# Patient Record
Sex: FEMALE | Race: WHITE | NOT HISPANIC OR LATINO | URBAN - METROPOLITAN AREA
[De-identification: names, ages, dates, MRNs, and addresses within clinical notes are randomized per-mention and may not be internally consistent; named-entity substitution may affect disease eponyms.]

---

## 2019-10-31 ENCOUNTER — OUTPATIENT (OUTPATIENT)
Dept: OUTPATIENT SERVICES | Facility: HOSPITAL | Age: 29
LOS: 1 days | End: 2019-10-31
Payer: COMMERCIAL

## 2019-10-31 ENCOUNTER — TRANSCRIPTION ENCOUNTER (OUTPATIENT)
Age: 29
End: 2019-10-31

## 2019-10-31 VITALS
RESPIRATION RATE: 18 BRPM | WEIGHT: 188.94 LBS | HEART RATE: 78 BPM | TEMPERATURE: 98 F | HEIGHT: 63 IN | DIASTOLIC BLOOD PRESSURE: 80 MMHG | SYSTOLIC BLOOD PRESSURE: 128 MMHG | OXYGEN SATURATION: 98 %

## 2019-10-31 DIAGNOSIS — O02.1 MISSED ABORTION: ICD-10-CM

## 2019-10-31 DIAGNOSIS — Z98.890 OTHER SPECIFIED POSTPROCEDURAL STATES: Chronic | ICD-10-CM

## 2019-10-31 LAB
BLD GP AB SCN SERPL QL: NEGATIVE — SIGNIFICANT CHANGE UP
HCT VFR BLD CALC: 41.7 % — SIGNIFICANT CHANGE UP (ref 34.5–45)
HGB BLD-MCNC: 14.1 G/DL — SIGNIFICANT CHANGE UP (ref 11.5–15.5)
MCHC RBC-ENTMCNC: 30.9 PG — SIGNIFICANT CHANGE UP (ref 27–34)
MCHC RBC-ENTMCNC: 33.8 GM/DL — SIGNIFICANT CHANGE UP (ref 32–36)
MCV RBC AUTO: 91.2 FL — SIGNIFICANT CHANGE UP (ref 80–100)
PLATELET # BLD AUTO: 244 K/UL — SIGNIFICANT CHANGE UP (ref 150–400)
RBC # BLD: 4.57 M/UL — SIGNIFICANT CHANGE UP (ref 3.8–5.2)
RBC # FLD: 12.1 % — SIGNIFICANT CHANGE UP (ref 10.3–14.5)
RH IG SCN BLD-IMP: POSITIVE — SIGNIFICANT CHANGE UP
WBC # BLD: 9.24 K/UL — SIGNIFICANT CHANGE UP (ref 3.8–10.5)
WBC # FLD AUTO: 9.24 K/UL — SIGNIFICANT CHANGE UP (ref 3.8–10.5)

## 2019-10-31 PROCEDURE — 86901 BLOOD TYPING SEROLOGIC RH(D): CPT

## 2019-10-31 PROCEDURE — 86850 RBC ANTIBODY SCREEN: CPT

## 2019-10-31 PROCEDURE — 85027 COMPLETE CBC AUTOMATED: CPT

## 2019-10-31 PROCEDURE — G0463: CPT

## 2019-10-31 PROCEDURE — 86900 BLOOD TYPING SEROLOGIC ABO: CPT

## 2019-10-31 RX ORDER — LIDOCAINE HCL 20 MG/ML
0.2 VIAL (ML) INJECTION ONCE
Refills: 0 | Status: DISCONTINUED | OUTPATIENT
Start: 2019-11-01 | End: 2019-11-17

## 2019-10-31 RX ORDER — ACETAMINOPHEN 500 MG
1000 TABLET ORAL ONCE
Refills: 0 | Status: COMPLETED | OUTPATIENT
Start: 2019-11-01 | End: 2019-11-01

## 2019-10-31 RX ORDER — SODIUM CHLORIDE 9 MG/ML
3 INJECTION INTRAMUSCULAR; INTRAVENOUS; SUBCUTANEOUS EVERY 8 HOURS
Refills: 0 | Status: DISCONTINUED | OUTPATIENT
Start: 2019-11-01 | End: 2019-11-17

## 2019-10-31 RX ORDER — CELECOXIB 200 MG/1
200 CAPSULE ORAL ONCE
Refills: 0 | Status: COMPLETED | OUTPATIENT
Start: 2019-11-01 | End: 2019-11-01

## 2019-10-31 NOTE — H&P PST ADULT - NSANTHOSAYNRD_GEN_A_CORE
No. SIM screening performed.  STOP BANG Legend: 0-2 = LOW Risk; 3-4 = INTERMEDIATE Risk; 5-8 = HIGH Risk

## 2019-10-31 NOTE — H&P PST ADULT - ATTENDING COMMENTS
Pt seen and examined in the office with Dr. Williamson on this patient with missed ab  pt was advised to use vaginal cytotec before the procedure.  R / b / a of the procedure was disc with the patient who understands and agrees with proceeding.

## 2019-10-31 NOTE — H&P PST ADULT - HISTORY OF PRESENT ILLNESS
This is a 30 y/o female a routine This is a 30 y/o female a routine sonogram revealed no fetal heartbeat, she presents today for  D&C suction curettage

## 2019-11-01 ENCOUNTER — RESULT REVIEW (OUTPATIENT)
Age: 29
End: 2019-11-01

## 2019-11-01 ENCOUNTER — OUTPATIENT (OUTPATIENT)
Dept: OUTPATIENT SERVICES | Facility: HOSPITAL | Age: 29
LOS: 1 days | Discharge: HOME | End: 2019-11-01
Payer: COMMERCIAL

## 2019-11-01 VITALS
SYSTOLIC BLOOD PRESSURE: 105 MMHG | OXYGEN SATURATION: 98 % | DIASTOLIC BLOOD PRESSURE: 62 MMHG | RESPIRATION RATE: 16 BRPM | HEART RATE: 59 BPM | TEMPERATURE: 97 F

## 2019-11-01 VITALS
RESPIRATION RATE: 14 BRPM | WEIGHT: 188.94 LBS | SYSTOLIC BLOOD PRESSURE: 99 MMHG | TEMPERATURE: 98 F | HEART RATE: 69 BPM | OXYGEN SATURATION: 98 % | DIASTOLIC BLOOD PRESSURE: 65 MMHG | HEIGHT: 63 IN

## 2019-11-01 DIAGNOSIS — Z98.890 OTHER SPECIFIED POSTPROCEDURAL STATES: Chronic | ICD-10-CM

## 2019-11-01 DIAGNOSIS — O02.1 MISSED ABORTION: ICD-10-CM

## 2019-11-01 PROCEDURE — 59820 CARE OF MISCARRIAGE: CPT

## 2019-11-01 PROCEDURE — 88305 TISSUE EXAM BY PATHOLOGIST: CPT

## 2019-11-01 PROCEDURE — 88305 TISSUE EXAM BY PATHOLOGIST: CPT | Mod: 26

## 2019-11-01 PROCEDURE — 88264 CHROMOSOME ANALYSIS 20-25: CPT

## 2019-11-01 PROCEDURE — 88233 TISSUE CULTURE SKIN/BIOPSY: CPT

## 2019-11-01 PROCEDURE — 88280 CHROMOSOME KARYOTYPE STUDY: CPT

## 2019-11-01 RX ORDER — OXYCODONE HYDROCHLORIDE 5 MG/1
10 TABLET ORAL ONCE
Refills: 0 | Status: DISCONTINUED | OUTPATIENT
Start: 2019-11-01 | End: 2019-11-01

## 2019-11-01 RX ORDER — SODIUM CHLORIDE 9 MG/ML
1000 INJECTION, SOLUTION INTRAVENOUS
Refills: 0 | Status: DISCONTINUED | OUTPATIENT
Start: 2019-11-01 | End: 2019-11-17

## 2019-11-01 RX ORDER — CELECOXIB 200 MG/1
200 CAPSULE ORAL ONCE
Refills: 0 | Status: DISCONTINUED | OUTPATIENT
Start: 2019-11-01 | End: 2019-11-17

## 2019-11-01 RX ORDER — ONDANSETRON 8 MG/1
4 TABLET, FILM COATED ORAL ONCE
Refills: 0 | Status: DISCONTINUED | OUTPATIENT
Start: 2019-11-01 | End: 2019-11-17

## 2019-11-01 RX ORDER — FAMOTIDINE 10 MG/ML
20 INJECTION INTRAVENOUS ONCE
Refills: 0 | Status: COMPLETED | OUTPATIENT
Start: 2019-11-01 | End: 2019-11-01

## 2019-11-01 RX ORDER — OXYCODONE HYDROCHLORIDE 5 MG/1
5 TABLET ORAL ONCE
Refills: 0 | Status: DISCONTINUED | OUTPATIENT
Start: 2019-11-01 | End: 2019-11-01

## 2019-11-01 RX ADMIN — FAMOTIDINE 20 MILLIGRAM(S): 10 INJECTION INTRAVENOUS at 09:57

## 2019-11-01 RX ADMIN — CELECOXIB 200 MILLIGRAM(S): 200 CAPSULE ORAL at 09:57

## 2019-11-01 RX ADMIN — Medication 1000 MILLIGRAM(S): at 09:57

## 2019-11-01 NOTE — PRE-ANESTHESIA EVALUATION ADULT - NSANTHPMHFT_GEN_ALL_CORE
History of asthma as child, no sxs or inhaler use >15 yrs. Denies other chronic medical problems. Rare GERD, uses TUMS. No recent illness.  LMP 7/12/19, patient states exam yesterday showed growth stopped @8weeks/4 dayskkkkkkkkkkkkkkkkkkkkkkkkkkkkkkkkkkkkkkkkkkkkkkkkkkkkkkkkkkkkkkkkkkkkkkkkkkkkkkkkkkkkkkkkkkk History of asthma as child, no sxs or inhaler use >15 yrs. Denies other chronic medical problems. Rare GERD, uses TUMS. No recent illness.  LMP 7/12/19, patient states exam yesterday showed growth stopped @8weeks/4 days

## 2019-11-01 NOTE — BRIEF OPERATIVE NOTE - NSICDXBRIEFPROCEDURE_GEN_ALL_CORE_FT
PROCEDURES:  Dilation and curettage of uterus using suction with ultrasound guidance 01-Nov-2019 11:46:31  Felicia Baeza

## 2019-11-01 NOTE — ASU DISCHARGE PLAN (ADULT/PEDIATRIC) - CALL YOUR DOCTOR IF YOU HAVE ANY OF THE FOLLOWING:
Wound/Surgical Site with redness, or foul smelling discharge or pus/Bleeding that does not stop/Numbness, tingling, color or temperature change to extremity/Fever greater than (need to indicate Fahrenheit or Celsius)/Swelling that gets worse/Inability to tolerate liquids or foods/Pain not relieved by Medications/Nausea and vomiting that does not stop/Unable to urinate/Excessive diarrhea/Increased irritability or sluggishness

## 2019-11-01 NOTE — ASU DISCHARGE PLAN (ADULT/PEDIATRIC) - CARE PROVIDER_API CALL
Stacy Castillo)  Obstetrics and Gynecology  [address] [place] [address], [place] [address]  Phone: [phone]  Fax: [phone]  Follow Up Time:

## 2019-11-01 NOTE — PRE-ANESTHESIA EVALUATION ADULT - NSANTHADDINFOFT_GEN_ALL_CORE
Denies CP, SOB, cough, fever, acid reflux.  D/w Dr Castillo, uterine size 12 weeks, fetus size 8 weeks, pregnancy by LMP 16 weeks. Decided to proceed with GETA. Patient, surgeon aware and agreed.

## 2019-11-01 NOTE — BRIEF OPERATIVE NOTE - OPERATION/FINDINGS
10w size anteverted uterus, no adnexal masses palpated bilaterally, thin endometrial stripe visualized on ultrasound at end of case, excellent hemostasis noted

## 2019-11-04 LAB — SURGICAL PATHOLOGY STUDY: SIGNIFICANT CHANGE UP

## 2019-11-14 LAB — CHROM ANALY OVERALL INTERP SPEC-IMP: SIGNIFICANT CHANGE UP

## 2020-04-17 ENCOUNTER — APPOINTMENT (OUTPATIENT)
Dept: ANTEPARTUM | Facility: CLINIC | Age: 30
End: 2020-04-17
Payer: COMMERCIAL

## 2020-04-17 ENCOUNTER — ASOB RESULT (OUTPATIENT)
Age: 30
End: 2020-04-17

## 2020-04-17 PROCEDURE — 76813 OB US NUCHAL MEAS 1 GEST: CPT

## 2020-04-17 PROCEDURE — 36416 COLLJ CAPILLARY BLOOD SPEC: CPT

## 2020-04-17 PROCEDURE — 76801 OB US < 14 WKS SINGLE FETUS: CPT

## 2020-05-15 ENCOUNTER — APPOINTMENT (OUTPATIENT)
Dept: ANTEPARTUM | Facility: CLINIC | Age: 30
End: 2020-05-15
Payer: COMMERCIAL

## 2020-05-15 ENCOUNTER — ASOB RESULT (OUTPATIENT)
Age: 30
End: 2020-05-15

## 2020-05-15 PROCEDURE — 76805 OB US >/= 14 WKS SNGL FETUS: CPT

## 2020-06-12 ENCOUNTER — APPOINTMENT (OUTPATIENT)
Dept: ANTEPARTUM | Facility: CLINIC | Age: 30
End: 2020-06-12
Payer: COMMERCIAL

## 2020-06-12 ENCOUNTER — ASOB RESULT (OUTPATIENT)
Age: 30
End: 2020-06-12

## 2020-06-12 ENCOUNTER — APPOINTMENT (OUTPATIENT)
Dept: ANTEPARTUM | Facility: CLINIC | Age: 30
End: 2020-06-12

## 2020-06-12 PROCEDURE — 76816 OB US FOLLOW-UP PER FETUS: CPT

## 2020-06-12 PROCEDURE — 76811 OB US DETAILED SNGL FETUS: CPT

## 2020-06-12 PROCEDURE — ZZZZZ: CPT

## 2020-06-12 PROCEDURE — 99212 OFFICE O/P EST SF 10 MIN: CPT

## 2020-06-22 ENCOUNTER — APPOINTMENT (OUTPATIENT)
Dept: PEDIATRIC CARDIOLOGY | Facility: CLINIC | Age: 30
End: 2020-06-22
Payer: COMMERCIAL

## 2020-06-22 PROCEDURE — 99201 OFFICE OUTPATIENT NEW 10 MINUTES: CPT | Mod: 25

## 2020-06-22 PROCEDURE — 76827 ECHO EXAM OF FETAL HEART: CPT

## 2020-06-22 PROCEDURE — 76825 ECHO EXAM OF FETAL HEART: CPT

## 2020-06-22 PROCEDURE — 93325 DOPPLER ECHO COLOR FLOW MAPG: CPT

## 2020-06-24 ENCOUNTER — APPOINTMENT (OUTPATIENT)
Dept: ANTEPARTUM | Facility: CLINIC | Age: 30
End: 2020-06-24
Payer: COMMERCIAL

## 2020-06-24 ENCOUNTER — ASOB RESULT (OUTPATIENT)
Age: 30
End: 2020-06-24

## 2020-06-24 PROCEDURE — 76815 OB US LIMITED FETUS(S): CPT

## 2020-06-24 PROCEDURE — 99212 OFFICE O/P EST SF 10 MIN: CPT | Mod: 25

## 2020-06-26 ENCOUNTER — APPOINTMENT (OUTPATIENT)
Dept: ANTEPARTUM | Facility: CLINIC | Age: 30
End: 2020-06-26

## 2020-07-03 LAB
CLARI ADDITIONAL INFO: NORMAL
CLARI CHROMOSOME 13: NORMAL
CLARI CHROMOSOME 18: NORMAL
CLARI CHROMOSOME 21: NORMAL
CLARI SEX CHROMOSOMES: NORMAL
CLARITEST NIPT: NORMAL

## 2020-07-24 ENCOUNTER — APPOINTMENT (OUTPATIENT)
Dept: ANTEPARTUM | Facility: CLINIC | Age: 30
End: 2020-07-24
Payer: COMMERCIAL

## 2020-07-24 ENCOUNTER — ASOB RESULT (OUTPATIENT)
Age: 30
End: 2020-07-24

## 2020-07-24 PROCEDURE — 76816 OB US FOLLOW-UP PER FETUS: CPT

## 2020-07-24 PROCEDURE — 99213 OFFICE O/P EST LOW 20 MIN: CPT | Mod: 25

## 2020-08-03 ENCOUNTER — APPOINTMENT (OUTPATIENT)
Dept: PEDIATRIC SURGERY | Facility: CLINIC | Age: 30
End: 2020-08-03
Payer: COMMERCIAL

## 2020-08-03 PROCEDURE — 99204 OFFICE O/P NEW MOD 45 MIN: CPT

## 2020-08-05 NOTE — ASSESSMENT
[FreeTextEntry1] : Ms. Fonseca is a 30 year old female, now with a 26 week gestational age fetus who was found to have a double bubble on ultrasound. Her fetal echocardiogram demonstrated an echogenic foci on the mitral valve without dysfunction and is otherwise normal.  She had normal NIPS and has deferred amniocentesis.  Of note, she has an older son, now 8 years old, who had duodenal atresia and underwent surgical repair with Dr. Sergio nunes in 2012.  \par \par  I spent time educating both Ms. Verduzco and her spouse about the possibility that their fetus has duodenal atresia. While it is possible that this finding resolves and their baby will not have any obstruction, I spent significant time educating them about this diagnosis and the implications of a duodenal obstruction after birth. I reviewed the anatomy with them and that their baby will require surgical intervention after birth. I discussed the procedure at length, including the possibility of either an open or laparoscopic technique. They understand that the type of procedure will be decided after birth based on the overall clinical picture of their baby. I discussed the risks of the procedure including but not limited to bleeding, infection, injury to adjacent structures, anastomotic leak, return to operating room, etc. They understand that it can take several weeks for anastomosis to start functioning and this may result in a prolonged NICU stay. I discussed with them their baby will receive intravenous nutrition in the interim. I discussed that duodenal atresia can be associated with other anomalies including cardiac, chromosomal, gastrointestinal, renal, etc and that a variety of tests will be performed both before and after the procedure to rule out these anomalies. I reviewed with them that I do not recommend a particular mode of delivery nor would I recommend an early delivery for this. At this time, they are planning to deliver at [facility].  I educated them about the Division of Pediatric Surgery and they understand that any one of my partners may be their baby's surgeon.  \par \par Ms. Verduzco and her  have my contact information and know to contact me going forward with any further questions or concerns.

## 2020-08-05 NOTE — REASON FOR VISIT
[New Patient Prenatal Visit] : a new patient prenatal visit [Family Member] : family member [FreeTextEntry4] : Dr. Jaydon Turner

## 2020-08-05 NOTE — CONSULT LETTER
[Dear  ___] : Dear  [unfilled], [Consult Letter:] : I had the pleasure of evaluating your patient, [unfilled]. [Consult Closing:] : Thank you very much for allowing me to participate in the care of this patient.  If you have any questions, please do not hesitate to contact me. [Please see my note below.] : Please see my note below. [Sincerely,] : Sincerely, [FreeTextEntry2] : Dr. Jaydon Turner\par [address] [facility], \[place] [address]\par \par \par Phone: [phone] [FreeTextEntry3] : Heriberto Dougherty MD\par Division of Pediatric, General, Thoracic and Endoscopic Surgery\par [facility]

## 2020-08-05 NOTE — HISTORY OF PRESENT ILLNESS
[FreeTextEntry1] : Ms. Verduzco is a 30 year old female here today as she had fetal ultrasound demonstrating a double bubble.  She is here with her  for surgical counseling.

## 2020-08-06 ENCOUNTER — APPOINTMENT (OUTPATIENT)
Dept: OBGYN | Facility: CLINIC | Age: 30
End: 2020-08-06

## 2020-08-13 ENCOUNTER — NON-APPOINTMENT (OUTPATIENT)
Age: 30
End: 2020-08-13

## 2020-08-13 ENCOUNTER — APPOINTMENT (OUTPATIENT)
Dept: OBGYN | Facility: CLINIC | Age: 30
End: 2020-08-13
Payer: COMMERCIAL

## 2020-08-13 VITALS
HEIGHT: 63 IN | TEMPERATURE: 97.8 F | WEIGHT: 216 LBS | SYSTOLIC BLOOD PRESSURE: 108 MMHG | HEART RATE: 88 BPM | BODY MASS INDEX: 38.27 KG/M2 | DIASTOLIC BLOOD PRESSURE: 73 MMHG

## 2020-08-13 PROCEDURE — 99212 OFFICE O/P EST SF 10 MIN: CPT

## 2020-08-13 RX ORDER — METFORMIN HYDROCHLORIDE 500 MG/1
500 TABLET, COATED ORAL
Qty: 30 | Refills: 0 | Status: DISCONTINUED | COMMUNITY
Start: 2020-03-21 | End: 2020-08-13

## 2020-08-14 ENCOUNTER — ASOB RESULT (OUTPATIENT)
Age: 30
End: 2020-08-14

## 2020-08-14 ENCOUNTER — APPOINTMENT (OUTPATIENT)
Dept: ANTEPARTUM | Facility: CLINIC | Age: 30
End: 2020-08-14
Payer: COMMERCIAL

## 2020-08-14 PROCEDURE — 99214 OFFICE O/P EST MOD 30 MIN: CPT | Mod: 25

## 2020-08-14 PROCEDURE — 76816 OB US FOLLOW-UP PER FETUS: CPT

## 2020-08-24 ENCOUNTER — APPOINTMENT (OUTPATIENT)
Dept: ANTEPARTUM | Facility: CLINIC | Age: 30
End: 2020-08-24
Payer: COMMERCIAL

## 2020-08-24 ENCOUNTER — ASOB RESULT (OUTPATIENT)
Age: 30
End: 2020-08-24

## 2020-08-24 PROCEDURE — 76805 OB US >/= 14 WKS SNGL FETUS: CPT

## 2020-08-24 PROCEDURE — 76819 FETAL BIOPHYS PROFIL W/O NST: CPT

## 2020-08-24 PROCEDURE — 99201 OFFICE OUTPATIENT NEW 10 MINUTES: CPT | Mod: 25

## 2020-09-09 ENCOUNTER — APPOINTMENT (OUTPATIENT)
Dept: ANTEPARTUM | Facility: CLINIC | Age: 30
End: 2020-09-09
Payer: COMMERCIAL

## 2020-09-09 ENCOUNTER — OUTPATIENT (OUTPATIENT)
Dept: OUTPATIENT SERVICES | Facility: HOSPITAL | Age: 30
LOS: 1 days | End: 2020-09-09

## 2020-09-09 ENCOUNTER — ASOB RESULT (OUTPATIENT)
Age: 30
End: 2020-09-09

## 2020-09-09 DIAGNOSIS — Z98.890 OTHER SPECIFIED POSTPROCEDURAL STATES: Chronic | ICD-10-CM

## 2020-09-09 PROCEDURE — 99213 OFFICE O/P EST LOW 20 MIN: CPT | Mod: 25

## 2020-09-09 PROCEDURE — 76818 FETAL BIOPHYS PROFILE W/NST: CPT | Mod: 26

## 2020-09-10 ENCOUNTER — NON-APPOINTMENT (OUTPATIENT)
Age: 30
End: 2020-09-10

## 2020-09-10 ENCOUNTER — APPOINTMENT (OUTPATIENT)
Dept: OBGYN | Facility: CLINIC | Age: 30
End: 2020-09-10
Payer: COMMERCIAL

## 2020-09-10 VITALS
BODY MASS INDEX: 39.34 KG/M2 | DIASTOLIC BLOOD PRESSURE: 81 MMHG | WEIGHT: 222 LBS | HEIGHT: 63 IN | SYSTOLIC BLOOD PRESSURE: 119 MMHG

## 2020-09-10 PROCEDURE — 99212 OFFICE O/P EST SF 10 MIN: CPT

## 2020-09-16 ENCOUNTER — APPOINTMENT (OUTPATIENT)
Dept: ANTEPARTUM | Facility: HOSPITAL | Age: 30
End: 2020-09-16

## 2020-09-16 ENCOUNTER — APPOINTMENT (OUTPATIENT)
Dept: ANTEPARTUM | Facility: CLINIC | Age: 30
End: 2020-09-16

## 2020-09-23 ENCOUNTER — ASOB RESULT (OUTPATIENT)
Age: 30
End: 2020-09-23

## 2020-09-23 ENCOUNTER — OUTPATIENT (OUTPATIENT)
Dept: OUTPATIENT SERVICES | Facility: HOSPITAL | Age: 30
LOS: 1 days | End: 2020-09-23

## 2020-09-23 ENCOUNTER — APPOINTMENT (OUTPATIENT)
Dept: ANTEPARTUM | Facility: CLINIC | Age: 30
End: 2020-09-23
Payer: COMMERCIAL

## 2020-09-23 ENCOUNTER — APPOINTMENT (OUTPATIENT)
Dept: ANTEPARTUM | Facility: HOSPITAL | Age: 30
End: 2020-09-23

## 2020-09-23 DIAGNOSIS — Z98.890 OTHER SPECIFIED POSTPROCEDURAL STATES: Chronic | ICD-10-CM

## 2020-09-23 PROCEDURE — 76816 OB US FOLLOW-UP PER FETUS: CPT

## 2020-09-23 PROCEDURE — 76818 FETAL BIOPHYS PROFILE W/NST: CPT | Mod: 26

## 2020-10-06 ENCOUNTER — NON-APPOINTMENT (OUTPATIENT)
Age: 30
End: 2020-10-06

## 2020-10-06 ENCOUNTER — APPOINTMENT (OUTPATIENT)
Dept: OBGYN | Facility: CLINIC | Age: 30
End: 2020-10-06
Payer: COMMERCIAL

## 2020-10-06 VITALS
WEIGHT: 232 LBS | SYSTOLIC BLOOD PRESSURE: 109 MMHG | DIASTOLIC BLOOD PRESSURE: 75 MMHG | HEIGHT: 63 IN | BODY MASS INDEX: 41.11 KG/M2 | TEMPERATURE: 98.2 F

## 2020-10-06 PROCEDURE — 59025 FETAL NON-STRESS TEST: CPT

## 2020-10-06 PROCEDURE — 99213 OFFICE O/P EST LOW 20 MIN: CPT | Mod: 25

## 2020-10-08 ENCOUNTER — APPOINTMENT (OUTPATIENT)
Dept: ANTEPARTUM | Facility: CLINIC | Age: 30
End: 2020-10-08

## 2020-10-08 ENCOUNTER — APPOINTMENT (OUTPATIENT)
Dept: ANTEPARTUM | Facility: HOSPITAL | Age: 30
End: 2020-10-08

## 2020-10-08 LAB
GP B STREP DNA SPEC QL NAA+PROBE: DETECTED
GP B STREP DNA SPEC QL NAA+PROBE: NORMAL
SOURCE GBS: NORMAL

## 2020-10-14 DIAGNOSIS — O28.3 ABNORMAL ULTRASONIC FINDING ON ANTENATAL SCREENING OF MOTHER: ICD-10-CM

## 2020-10-14 DIAGNOSIS — O40.3XX0 POLYHYDRAMNIOS, THIRD TRIMESTER, NOT APPLICABLE OR UNSPECIFIED: ICD-10-CM

## 2020-10-14 DIAGNOSIS — O35.8XX0 MATERNAL CARE FOR OTHER (SUSPECTED) FETAL ABNORMALITY AND DAMAGE, NOT APPLICABLE OR UNSPECIFIED: ICD-10-CM

## 2020-10-15 ENCOUNTER — ASOB RESULT (OUTPATIENT)
Age: 30
End: 2020-10-15

## 2020-10-15 ENCOUNTER — APPOINTMENT (OUTPATIENT)
Dept: ANTEPARTUM | Facility: HOSPITAL | Age: 30
End: 2020-10-15

## 2020-10-15 ENCOUNTER — TRANSCRIPTION ENCOUNTER (OUTPATIENT)
Age: 30
End: 2020-10-15

## 2020-10-15 ENCOUNTER — OUTPATIENT (OUTPATIENT)
Dept: OUTPATIENT SERVICES | Facility: HOSPITAL | Age: 30
LOS: 1 days | End: 2020-10-15

## 2020-10-15 ENCOUNTER — APPOINTMENT (OUTPATIENT)
Dept: ANTEPARTUM | Facility: CLINIC | Age: 30
End: 2020-10-15
Payer: COMMERCIAL

## 2020-10-15 ENCOUNTER — APPOINTMENT (OUTPATIENT)
Dept: OBGYN | Facility: CLINIC | Age: 30
End: 2020-10-15
Payer: COMMERCIAL

## 2020-10-15 ENCOUNTER — NON-APPOINTMENT (OUTPATIENT)
Age: 30
End: 2020-10-15

## 2020-10-15 VITALS
DIASTOLIC BLOOD PRESSURE: 76 MMHG | BODY MASS INDEX: 42.88 KG/M2 | SYSTOLIC BLOOD PRESSURE: 112 MMHG | WEIGHT: 242 LBS | TEMPERATURE: 98.4 F | HEIGHT: 63 IN

## 2020-10-15 DIAGNOSIS — Z98.890 OTHER SPECIFIED POSTPROCEDURAL STATES: Chronic | ICD-10-CM

## 2020-10-15 PROCEDURE — 99212 OFFICE O/P EST SF 10 MIN: CPT | Mod: 25

## 2020-10-15 PROCEDURE — 76816 OB US FOLLOW-UP PER FETUS: CPT

## 2020-10-15 PROCEDURE — 99212 OFFICE O/P EST SF 10 MIN: CPT

## 2020-10-15 PROCEDURE — 76818 FETAL BIOPHYS PROFILE W/NST: CPT | Mod: 26

## 2020-10-16 ENCOUNTER — TRANSCRIPTION ENCOUNTER (OUTPATIENT)
Age: 30
End: 2020-10-16

## 2020-10-16 ENCOUNTER — RESULT REVIEW (OUTPATIENT)
Age: 30
End: 2020-10-16

## 2020-10-16 ENCOUNTER — INPATIENT (INPATIENT)
Facility: HOSPITAL | Age: 30
LOS: 2 days | Discharge: HOME | End: 2020-10-19
Attending: OBSTETRICS & GYNECOLOGY | Admitting: OBSTETRICS & GYNECOLOGY
Payer: COMMERCIAL

## 2020-10-16 VITALS
TEMPERATURE: 100 F | HEART RATE: 97 BPM | DIASTOLIC BLOOD PRESSURE: 69 MMHG | RESPIRATION RATE: 16 BRPM | SYSTOLIC BLOOD PRESSURE: 116 MMHG

## 2020-10-16 DIAGNOSIS — Z98.890 OTHER SPECIFIED POSTPROCEDURAL STATES: Chronic | ICD-10-CM

## 2020-10-16 DIAGNOSIS — Z3A.00 WEEKS OF GESTATION OF PREGNANCY NOT SPECIFIED: ICD-10-CM

## 2020-10-16 DIAGNOSIS — O26.899 OTHER SPECIFIED PREGNANCY RELATED CONDITIONS, UNSPECIFIED TRIMESTER: ICD-10-CM

## 2020-10-16 LAB
BASOPHILS # BLD AUTO: 0.03 K/UL — SIGNIFICANT CHANGE UP (ref 0–0.2)
BASOPHILS NFR BLD AUTO: 0.2 % — SIGNIFICANT CHANGE UP (ref 0–2)
BLD GP AB SCN SERPL QL: NEGATIVE — SIGNIFICANT CHANGE UP
EOSINOPHIL # BLD AUTO: 0.04 K/UL — SIGNIFICANT CHANGE UP (ref 0–0.5)
EOSINOPHIL NFR BLD AUTO: 0.3 % — SIGNIFICANT CHANGE UP (ref 0–6)
HCT VFR BLD CALC: 38.9 % — SIGNIFICANT CHANGE UP (ref 34.5–45)
HGB BLD-MCNC: 12.8 G/DL — SIGNIFICANT CHANGE UP (ref 11.5–15.5)
IMM GRANULOCYTES NFR BLD AUTO: 0.5 % — SIGNIFICANT CHANGE UP (ref 0–1.5)
LYMPHOCYTES # BLD AUTO: 1.37 K/UL — SIGNIFICANT CHANGE UP (ref 1–3.3)
LYMPHOCYTES # BLD AUTO: 9 % — LOW (ref 13–44)
MCHC RBC-ENTMCNC: 29.6 PG — SIGNIFICANT CHANGE UP (ref 27–34)
MCHC RBC-ENTMCNC: 32.9 % — SIGNIFICANT CHANGE UP (ref 32–36)
MCV RBC AUTO: 90 FL — SIGNIFICANT CHANGE UP (ref 80–100)
MONOCYTES # BLD AUTO: 1.12 K/UL — HIGH (ref 0–0.9)
MONOCYTES NFR BLD AUTO: 7.3 % — SIGNIFICANT CHANGE UP (ref 2–14)
NEUTROPHILS # BLD AUTO: 12.62 K/UL — HIGH (ref 1.8–7.4)
NEUTROPHILS NFR BLD AUTO: 82.7 % — HIGH (ref 43–77)
NRBC # FLD: 0 K/UL — SIGNIFICANT CHANGE UP (ref 0–0)
PLATELET # BLD AUTO: 190 K/UL — SIGNIFICANT CHANGE UP (ref 150–400)
PMV BLD: 11.4 FL — SIGNIFICANT CHANGE UP (ref 7–13)
RBC # BLD: 4.32 M/UL — SIGNIFICANT CHANGE UP (ref 3.8–5.2)
RBC # FLD: 12.6 % — SIGNIFICANT CHANGE UP (ref 10.3–14.5)
RH IG SCN BLD-IMP: POSITIVE — SIGNIFICANT CHANGE UP
SARS-COV-2 RNA SPEC QL NAA+PROBE: SIGNIFICANT CHANGE UP
WBC # BLD: 15.25 K/UL — HIGH (ref 3.8–10.5)
WBC # FLD AUTO: 15.25 K/UL — HIGH (ref 3.8–10.5)

## 2020-10-16 PROCEDURE — 88307 TISSUE EXAM BY PATHOLOGIST: CPT | Mod: 26

## 2020-10-16 PROCEDURE — 59514 CESAREAN DELIVERY ONLY: CPT

## 2020-10-16 PROCEDURE — 59514 CESAREAN DELIVERY ONLY: CPT | Mod: U7

## 2020-10-16 RX ORDER — OXYTOCIN 10 UNIT/ML
333.33 VIAL (ML) INJECTION
Qty: 20 | Refills: 0 | Status: DISCONTINUED | OUTPATIENT
Start: 2020-10-16 | End: 2020-10-17

## 2020-10-16 RX ORDER — AMPICILLIN TRIHYDRATE 250 MG
2 CAPSULE ORAL ONCE
Refills: 0 | Status: COMPLETED | OUTPATIENT
Start: 2020-10-16 | End: 2020-10-16

## 2020-10-16 RX ORDER — FENTANYL CITRATE 50 UG/ML
25 INJECTION INTRAVENOUS
Refills: 0 | Status: DISCONTINUED | OUTPATIENT
Start: 2020-10-16 | End: 2020-10-17

## 2020-10-16 RX ORDER — AMPICILLIN TRIHYDRATE 250 MG
1 CAPSULE ORAL EVERY 4 HOURS
Refills: 0 | Status: DISCONTINUED | OUTPATIENT
Start: 2020-10-16 | End: 2020-10-16

## 2020-10-16 RX ORDER — INFLUENZA VIRUS VACCINE 15; 15; 15; 15 UG/.5ML; UG/.5ML; UG/.5ML; UG/.5ML
0.5 SUSPENSION INTRAMUSCULAR ONCE
Refills: 0 | Status: DISCONTINUED | OUTPATIENT
Start: 2020-10-16 | End: 2020-10-19

## 2020-10-16 RX ORDER — HYDROMORPHONE HYDROCHLORIDE 2 MG/ML
0.5 INJECTION INTRAMUSCULAR; INTRAVENOUS; SUBCUTANEOUS
Refills: 0 | Status: DISCONTINUED | OUTPATIENT
Start: 2020-10-16 | End: 2020-10-17

## 2020-10-16 RX ORDER — OXYCODONE HYDROCHLORIDE 5 MG/1
5 TABLET ORAL
Refills: 0 | Status: DISCONTINUED | OUTPATIENT
Start: 2020-10-16 | End: 2020-10-19

## 2020-10-16 RX ORDER — MAGNESIUM HYDROXIDE 400 MG/1
30 TABLET, CHEWABLE ORAL
Refills: 0 | Status: DISCONTINUED | OUTPATIENT
Start: 2020-10-16 | End: 2020-10-19

## 2020-10-16 RX ORDER — SIMETHICONE 80 MG/1
80 TABLET, CHEWABLE ORAL EVERY 4 HOURS
Refills: 0 | Status: DISCONTINUED | OUTPATIENT
Start: 2020-10-16 | End: 2020-10-19

## 2020-10-16 RX ORDER — TETANUS TOXOID, REDUCED DIPHTHERIA TOXOID AND ACELLULAR PERTUSSIS VACCINE, ADSORBED 5; 2.5; 8; 8; 2.5 [IU]/.5ML; [IU]/.5ML; UG/.5ML; UG/.5ML; UG/.5ML
0.5 SUSPENSION INTRAMUSCULAR ONCE
Refills: 0 | Status: DISCONTINUED | OUTPATIENT
Start: 2020-10-16 | End: 2020-10-19

## 2020-10-16 RX ORDER — IBUPROFEN 200 MG
600 TABLET ORAL EVERY 6 HOURS
Refills: 0 | Status: COMPLETED | OUTPATIENT
Start: 2020-10-16 | End: 2021-09-14

## 2020-10-16 RX ORDER — OXYCODONE HYDROCHLORIDE 5 MG/1
5 TABLET ORAL ONCE
Refills: 0 | Status: DISCONTINUED | OUTPATIENT
Start: 2020-10-16 | End: 2020-10-19

## 2020-10-16 RX ORDER — HYDROMORPHONE HYDROCHLORIDE 2 MG/ML
1 INJECTION INTRAMUSCULAR; INTRAVENOUS; SUBCUTANEOUS
Refills: 0 | Status: DISCONTINUED | OUTPATIENT
Start: 2020-10-16 | End: 2020-10-16

## 2020-10-16 RX ORDER — SODIUM CHLORIDE 9 MG/ML
1000 INJECTION, SOLUTION INTRAVENOUS
Refills: 0 | Status: DISCONTINUED | OUTPATIENT
Start: 2020-10-16 | End: 2020-10-16

## 2020-10-16 RX ORDER — SODIUM CHLORIDE 9 MG/ML
1000 INJECTION, SOLUTION INTRAVENOUS
Refills: 0 | Status: DISCONTINUED | OUTPATIENT
Start: 2020-10-16 | End: 2020-10-17

## 2020-10-16 RX ORDER — OXYTOCIN 10 UNIT/ML
333.33 VIAL (ML) INJECTION
Qty: 20 | Refills: 0 | Status: DISCONTINUED | OUTPATIENT
Start: 2020-10-16 | End: 2020-10-16

## 2020-10-16 RX ORDER — KETOROLAC TROMETHAMINE 30 MG/ML
30 SYRINGE (ML) INJECTION EVERY 6 HOURS
Refills: 0 | Status: DISCONTINUED | OUTPATIENT
Start: 2020-10-16 | End: 2020-10-17

## 2020-10-16 RX ORDER — CEFAZOLIN SODIUM 1 G
2000 VIAL (EA) INJECTION EVERY 8 HOURS
Refills: 0 | Status: COMPLETED | OUTPATIENT
Start: 2020-10-16 | End: 2020-10-17

## 2020-10-16 RX ORDER — ONDANSETRON 8 MG/1
4 TABLET, FILM COATED ORAL ONCE
Refills: 0 | Status: DISCONTINUED | OUTPATIENT
Start: 2020-10-16 | End: 2020-10-17

## 2020-10-16 RX ORDER — ACETAMINOPHEN 500 MG
975 TABLET ORAL
Refills: 0 | Status: DISCONTINUED | OUTPATIENT
Start: 2020-10-16 | End: 2020-10-19

## 2020-10-16 RX ORDER — DIPHENHYDRAMINE HCL 50 MG
25 CAPSULE ORAL EVERY 6 HOURS
Refills: 0 | Status: DISCONTINUED | OUTPATIENT
Start: 2020-10-16 | End: 2020-10-19

## 2020-10-16 RX ORDER — OXYCODONE HYDROCHLORIDE 5 MG/1
5 TABLET ORAL
Refills: 0 | Status: COMPLETED | OUTPATIENT
Start: 2020-10-16 | End: 2020-10-23

## 2020-10-16 RX ORDER — LANOLIN
1 OINTMENT (GRAM) TOPICAL EVERY 6 HOURS
Refills: 0 | Status: DISCONTINUED | OUTPATIENT
Start: 2020-10-16 | End: 2020-10-19

## 2020-10-16 RX ADMIN — Medication 216 GRAM(S): at 16:51

## 2020-10-16 RX ADMIN — HYDROMORPHONE HYDROCHLORIDE 0.5 MILLIGRAM(S): 2 INJECTION INTRAMUSCULAR; INTRAVENOUS; SUBCUTANEOUS at 22:30

## 2020-10-16 RX ADMIN — HYDROMORPHONE HYDROCHLORIDE 0.5 MILLIGRAM(S): 2 INJECTION INTRAMUSCULAR; INTRAVENOUS; SUBCUTANEOUS at 22:33

## 2020-10-16 RX ADMIN — OXYCODONE HYDROCHLORIDE 5 MILLIGRAM(S): 5 TABLET ORAL at 21:30

## 2020-10-16 RX ADMIN — Medication 975 MILLIGRAM(S): at 22:14

## 2020-10-16 RX ADMIN — HYDROMORPHONE HYDROCHLORIDE 1 MILLIGRAM(S): 2 INJECTION INTRAMUSCULAR; INTRAVENOUS; SUBCUTANEOUS at 22:12

## 2020-10-16 RX ADMIN — HYDROMORPHONE HYDROCHLORIDE 1 MILLIGRAM(S): 2 INJECTION INTRAMUSCULAR; INTRAVENOUS; SUBCUTANEOUS at 22:26

## 2020-10-16 RX ADMIN — OXYCODONE HYDROCHLORIDE 5 MILLIGRAM(S): 5 TABLET ORAL at 22:17

## 2020-10-16 RX ADMIN — Medication 975 MILLIGRAM(S): at 21:30

## 2020-10-16 NOTE — OB PROVIDER H&P - PROBLEM SELECTOR PLAN 1
Admit to labor and delivery for pain management as per   - labor vs overdistended uterus due to polyhydramnios   -  huddle completed  - patient for epidural placement  - Ampicillin for GBS positive status  - MFM present for huddle, plan to discuss further with   - PRBC x 2 units, placed on hold

## 2020-10-16 NOTE — CONSULT NOTE ADULT - SUBJECTIVE AND OBJECTIVE BOX
MFM CONSULT NOTE:     HPI:  29 yo  at 37w5d, with known duodenal atresia and subsequent poly, presenting with contractions since 9:30 am. She denies any LOF or VB and has felt some FM though difficult given poly.       AP complications:   - GBS status: positive 10/7/2020  - fetal duodenal atresia  - ATU scan 10/15/2020 cephalic, anterior placenta, CHICHI 48.6,  BPP, EFW 2982  Medical History: Denies  Surgical History: D&C x 2  OBGYN History:  2012  5-14, duodenal atresia   2013  7-8  2014  7-15  SAB x 3, D&C x 2 (16 Oct 2020 15:50)      Vital Signs Last 24 Hrs  T(C): 37.5 (16 Oct 2020 14:19), Max: 37.5 (16 Oct 2020 14:19)  T(F): 99.5 (16 Oct 2020 14:19), Max: 99.5 (16 Oct 2020 14:19)  HR: 106 (16 Oct 2020 16:41) (96 - 109)  BP: 111/70 (16 Oct 2020 15:32) (111/70 - 124/79)  BP(mean): --  RR: 16 (16 Oct 2020 14:19) (16 - 16)  SpO2: 97% (16 Oct 2020 16:36) (90% - 98%)    PHYSICAL EXAM:  Gen: NAD, uncomfortable with contractions   Pulm: normal effort   Abd: soft, non tender, gravid    SVE at       RADIOLOGY & ADDITIONAL STUDIES:  Per bedside triage TAUS: vertex presentation, CHICHI 48cm MFM CONSULT NOTE:     HPI:  29 yo  at 37w5d, with known duodenal atresia and subsequent poly, presenting with contractions since 9:30 am. She denies any LOF or VB and has felt some FM though difficult given poly.  Of ntoe, patient was counseled on the fetal findings prior during outpatient imaging, and declined Amnio after her MFM consult.        AP complications:   - GBS status: positive 10/7/2020  - fetal duodenal atresia  - ATU scan 10/15/2020 cephalic, anterior placenta, CHICHI 48.6,  BPP, EFW 2982  Medical History: Denies  Surgical History: D&C x 2  OBGYN History:  2012  5-14, duodenal atresia   2013  7-8  2014  7-15  SAB x 3, D&C x 2 (16 Oct 2020 15:50)      Vital Signs Last 24 Hrs  T(C): 37.5 (16 Oct 2020 14:19), Max: 37.5 (16 Oct 2020 14:19)  T(F): 99.5 (16 Oct 2020 14:19), Max: 99.5 (16 Oct 2020 14:19)  HR: 106 (16 Oct 2020 16:41) (96 - 109)  BP: 111/70 (16 Oct 2020 15:32) (111/70 - 124/79)  BP(mean): --  RR: 16 (16 Oct 2020 14:19) (16 - 16)  SpO2: 97% (16 Oct 2020 16:36) (90% - 98%)    PHYSICAL EXAM:  Gen: NAD, uncomfortable with contractions   Pulm: normal effort   Abd: soft, non tender, gravid    SVE by NP in triage 4 cm (was 2 cm on exam yesterday)       RADIOLOGY & ADDITIONAL STUDIES:  Per bedside triage TAUS: vertex presentation, CHICHI 48cm

## 2020-10-16 NOTE — OB PROVIDER H&P - HISTORY OF PRESENT ILLNESS
's patient is a 31 y/o EDC 2020 EGA 37 5/7  reports of cramping and back pain. Patient reports of fetal movement. Denies loss of fluid, vaginal bleeding.     AP complications:   - GBS status: positive 10/7/2020  - fetal duodenal atresia  - ATU scan 10/15/2020 cephalic, anterior placenta, CHICHI 48.6,  BPP, EFW 2982  Medical History: Denies  Surgical History: D&C x 2  OBGYN History:  2012  5-14, duodenal atresia   2013  7-8  2014  7-15  SAB x 3, D&C x 2

## 2020-10-16 NOTE — OB PROVIDER TRIAGE NOTE - NSOBPROVIDERNOTE_OBGYN_ALL_OB_FT
Admit to labor and delivery for pain management as per   - labor vs overdistended uterus due to polyhydramnios   -  huddle completed  - patient for epidural placement  - Ampicillin for GBS positive status  - MFM present for huddle, plan to discuss further with

## 2020-10-16 NOTE — OB PROVIDER TRIAGE NOTE - HISTORY OF PRESENT ILLNESS
's patient is a 29 y/o EDC 2020 EGA 37 5/7  reports of cramping and back pain. Patient reports of fetal movement. Denies loss of fluid, vaginal bleeding.     AP complications:   - GBS status: positive 10/7/2020  - fetal duodenal atresia  - ATU scan 10/15/2020 cephalic, anterior placenta, CHICHI 48.6,  BPP, EFW 2982  Medical History: Denies  Surgical History: D&C x 2  OBGYN History:  2012  5-14, duodenal atresia   2013  7-8  2014  7-15  SAB x 3, D&C x 2

## 2020-10-16 NOTE — DISCHARGE NOTE OB - CARE PLAN
Principal Discharge DX:	 delivery delivered  Goal:	recovery  Assessment and plan of treatment:	pelvic rest x 6weeks  Secondary Diagnosis:	Polyhydramnios in third trimester complication, single or unspecified fetus

## 2020-10-16 NOTE — CONSULT NOTE ADULT - ASSESSMENT
29 yo  at 37w5d, with known duodenal atresia and subsequent poly (48cm), presenting in early labor.   - Huddle held with anesthesia, MFM, primary OB, and NICU teams present   - patient desiring epidural  - For active management of labor once stablized on labor and delivery room   - For controlled AROM with spinal needle with US present in the room  - Patient was counseled by NICU and understands expected  course with short interval surgery   - All teams in agreement with the above plan       ANA LUISA Lind Fellow   ANA LUISA Kim Attending

## 2020-10-16 NOTE — OB PROVIDER TRIAGE NOTE - NSHPPHYSICALEXAM_GEN_ALL_CORE
Vital Signs Last 24 Hrs  HR: 100 (16 Oct 2020 15:32) (97 - 106)  BP: 111/70 (16 Oct 2020 15:32) (111/70 - 124/79)  RR: 16 (16 Oct 2020 14:19) (16 - 16)  TAS: cephalic presentation  FHR: 160 baseline with moderate variability, 10 x 10, no decelerations  CTX: uterine irritability   SVE: 4/70/-3

## 2020-10-16 NOTE — CONSULT NOTE PEDS - SUBJECTIVE AND OBJECTIVE BOX
Ms. Verduzco is a 31 y/o  admitted to L&D with significant polyhydramnios (CHICHI 48) in the setting of fetal duodenal atresia and is expected to deliver this admission.  The NICU team met with Ms. Verduzco this afternoon to discuss what to expect following delivery of her infant.  Ms. Verduzco also had the opportunity to speak with pediatric surgery team as an outpatient prior to this admission. Of note, Ms. Verduzco has an older child who was also born with duodenal atresia in .  The NICU team will be present at her delivery and will immediately assess and care for her infant.  Her infant will require surgical correction, and the NICU team will be in close communication with the pediatric surgery team around the time of delivery.     Prior to undergoing repair of his duodenal atresia, the infant will not be able to feed by mouth. He will receive IV nutrition (TPN), likely through an umbilical line or a PICC, and his stomach will be decompressed with a catheter.   Following surgery he will receive post-operative care in the NICU.  Enteral feeds may be started once his clinical status permits and there is evidence of return of bowel function.    Length of stay is highly variable. Prior to discharge, patient must be tolerating full oral feeds and demonstrating appropriate weight gain.   Ms. Verduzco had the opportunity to ask questions and may contact the NICU at any time if further questions arise.

## 2020-10-16 NOTE — OB NEONATOLOGY/PEDIATRICIAN DELIVERY SUMMARY - NSPEDSNEONOTESA_OBGYN_ALL_OB_FT
Baby boy born at 37.5 wks via CS to a 29y/o  O+ blood type mother. Maternal history of PCOS, GERD, asthma as child, SABx2, on PNV, ASA.. Prenatal history of polyhydramnios and duodenal atresia seen on fetal US. PNL nr/immune/-, GBS + on 10/7. Mother received Ampicillin<2hr prior to delivery. SROM at 1836 with clear fluids. Baby emerged with APGARS of 3/9. Mom would like to breastfeed, consents Hep B and declines circ. EOS 0.05. Baby received PPV and CPAP until O2 sats were stable on RA. Temperature at delivery was 36.5 Celsius.

## 2020-10-16 NOTE — DISCHARGE NOTE OB - CARE PROVIDER_API CALL
Mia Malhotra J  OBSTETRICS AND GYNECOLOGY  [address] [place] [address]  Phone: [phone]  Fax: [phone]  Follow Up Time:

## 2020-10-16 NOTE — OB RN DELIVERY SUMMARY - NS_SEPSISRSKCALC_OBGYN_ALL_OB_FT
GBS status in the 'Prenatal Lab tests/results section' on the OB RN Patient Profile must be documented.   EOS calculated successfully. EOS Risk Factor: 0.5/1000 live births ([facility] national incidence); GA=37w5d; Temp=99.5; ROM=0.267; GBS='Positive'; Antibiotics='No antibiotics or any antibiotics < 2 hrs prior to birth'

## 2020-10-16 NOTE — PROGRESS NOTE ADULT - SUBJECTIVE AND OBJECTIVE BOX
Delayed entry.   Pt examined at 6:40 for fetal heart rate deceleration.  AROM with scalp electrode performed. Copious fluid as anticipated.  ISE placed and fetal heart noted to be in the 50s.    Discussed with patient the need to move quickly to OR to recheck the fetal heart and possible c/s.    VE had been unchanged at 4cm.    See Op note

## 2020-10-16 NOTE — OB PROVIDER H&P - ATTENDING COMMENTS
I examined and evaluated the patient  I discussed the patient with the NP and agree with the documentation with the following edits and additions:    29yo  at 37w5d presents with "crampy abdominal pain"  Patient reports severe discomfort  Cannot lie flat  She is not sure if this is labor pain  H/o significant for   1) Polyhydramnios - CHICHI  2) Fetal anomaly/dudodenal atresia s/p Pediatric surgery consults  3) GBS positive    I reviewed the patient status with her.  Initially she declined epidural for pan management since she has a history of a spinal headache in a prior pregnancy  I reviewed the fetal heart tracing with her which is not reactive, without acel or decel, FHR 160bpm, mod arvind  I explained that given the FHR she was not a candidate for IV Pain medication  Patient reported she was in a lot of discomfort and wanted an epidural    I examined the patient and she was 4/70/-3.  Head was not well applied  I discussed my concern for cord prolapse given the head is not applied and the patient has the risk factor of polyhydramnios  I offered  delivery as a controlled delivery to eliminate the chances of cord prolapse.  Patient declined  She endorsed understanding that cord prolapse is an emergency and could result in emergent  section, maternal morbidity and  fetal morbidity and mortality     huddle was called with Anesthesia,  NICU and MFM present    We discussed the plan of care for the patient as outlined below  -Admit to L&D  -Ampicillin for GBS prophylaxis  -Epidural for pain management  -Awaiting MFM recommendation wrt IOL versus amnio reduction in the event labor does not progress verus controlled AROM     I reviewed the plan of care with the patient and all her questions were answered to her satisfaction    NEVA Marin MD [facility] FACOG

## 2020-10-16 NOTE — OB RN DELIVERY SUMMARY - NS_DELIVERYCRNA_OBGYN_ALL_OB_FT
Problem: Dysphagia (Adult)  Goal: *Acute Goals and Plan of Care (Insert Text)  Description: Patient will:  1. Tolerate PO trials with 0 s/s overt distress in 4/5 trials  2. Utilize compensatory swallow strategies/maneuvers (decrease bite/sip, size/rate, alt. liq/sol) with min cues in 4/5 trials  3. Perform oral-motor/laryngeal exercises to increase oropharyngeal swallow function with min cues  4. Complete an objective swallow study (i.e., MBSS) to assess swallow integrity, r/o aspiration, and determine of safest LRD, min A as indicated/ordered by MD     Recommend:   NPO   Strict aspiration precautions (HOB >30 degrees at all times, Oral care TID)  May benefit from palliative care consult to address ? alternative means of nutrition or comfort feeds if no improvement    Outcome: Progressing Towards Goal     Problem: Communication Impaired (Adult)  Goal: *Acute Goals and Plan of Care (Insert Text)  Description: 1. Pt will complete varied naming tasks with min A in 4/5 trials for improved ability to express basic wants/needs/ideas. 2.  Pt will complete sentence completion tasks with min A in 4/5 trials for improved ability to express basic wants/needs/ideas  3. Pt will answer y/n questions in 4/5 trials for improved ability to express basic wants/needs/ideas. 4. Pt will utilize compensatory speech strategies for improved ability to communicate basic wants/needs/ideas in 4/5 trials given min cues. 5.  Pt will be able to follow one step commands in 4/5 trials given mod multi-modal cues for improved functional independence and quality of life.      Outcome: Progressing Towards Goal    SPEECH LANGUAGE PATHOLOGY DYSPHAGIA TREATMENT    Patient: Virgen Mares (75 y.o. female)  Date: 8/24/2020  Diagnosis: Cellulitis [L03.90]  Cerebrovascular accident (CVA) ([facility] [address] [I63.9]  Elevated troponin I level [R79.89]  Cerebrovascular accident (CVA) ([facility] [address] [I63.9]  CVA (cerebral vascular accident) ([facility] [address] [I63.9]   <principal problem not specified>  Procedure(s) (LRB):  VENA CAVA FILTER INSERTION (Right) 4 Days Post-Op  Precautions: Aspiration, Fall, Contact, Skin  PLOF: As per H&P      ASSESSMENT:  DYSPHAGIA:  Pt seen for follow up dysphagia tx with NG in place. Pt alert and following one step commands. ? Oral apraxia as pt with difficulty approximating oral motor movements (unable to complete pucker/smile or tongue protrusion) with groping noted. Pt demo delayed a-p transit with ice chips and pudding with swallow delay, multiple swallows per bolus and wet/weak cough following all presentations. Pt continues to be at high risk of aspiration with oral intake. Recommend continue NPO with GI consult for possible PEG placement. Will continue to follow for further dysphagia management. D/w pt and MD.      SPEECH-LANGUAGE:   Pt answering y/n questions with x60% accuracy via head nod/shake. Pt with mostly unintelligible speech; stated \"no more\" following ice chip presentation. Utilizing gestures to point to door x1 during session. Unable to utilize low tech communication board to express basic wants/needs/ideas despite max multi-modal cues. Will continue to follow. Progression toward goals:  []         Improving appropriately and progressing toward goals  [x]         Improving slowly and progressing toward goals  []         Not making progress toward goals and plan of care will be adjusted     PLAN:  Recommendations and Planned Interventions:  Patient continues to benefit from skilled intervention to address the above impairments. Continue treatment per established plan of care. Discharge Recommendations: To Be Determined     SUBJECTIVE:   Patient stated No more.     OBJECTIVE:   Cognitive and Communication Status:  Neurologic State: Alert  Orientation Level: Oriented to person, Oriented to place, Unable to verbalize  Cognition: Follows commands  Perception: (pt with eyes closed this session; cues to turn head to the L)  Perseveration: No perseveration noted  Safety/Judgement: Fall prevention  Dysphagia Treatment:  Oral Assessment:  Oral Assessment  Labial: Right droop, Impaired coordination, Decreased seal  Dentition: Natural, Poor  Oral Hygiene: Poor  Lingual: Decreased strength, Decreased rate, Incoordinated  Velum: Unable to visualize  Mandible: Restricted  P.O.  Trials:   Patient Position: 45 at [facility]   Vocal quality prior to P.O.: Low volume, Breathy   Consistency Presented: Ice chips, Pudding   How Presented: SLP-fed/presented, Spoon   Bolus Acceptance: Impaired   Bolus Formation/Control: Impaired   Type of Impairment: Delayed, Poor, Posterior, Mastication   Propulsion: Discoordination, Delayed (# of seconds)   Oral Residue: Less than 10% of bolus, Lingual   Initiation of Swallow: Delayed (# of seconds)   Laryngeal Elevation: Decreased, Weak   Aspiration Signs/Symptoms: Change vocal quality, Clear throat, Weak cough    Pharyngeal Phase Characteristics: Poor endurance, Suspected pharyngeal residue, Altered vocal quality   Effective Modifications: None   Cues for Modifications: Maximal   Oral Phase Severity: Severe   Pharyngeal Phase Severity : Severe     PAIN:  Start of Tx: unable to verbalize   End of Tx: unable to verbalize      After treatment:   []              Patient left in no apparent distress sitting up in chair  [x]              Patient left in no apparent distress in bed  [x]              Call bell left within reach  [x]              Nursing notified  []              Family present  []              Caregiver present  []              Bed alarm activated      COMMUNICATION/EDUCATION:   [x] Aspiration precautions; swallow safety; compensatory techniques  []        Patient/family able to participate in training and education   [x]  Patient unable to participate in training and education, education ongoing with staff   [] Patient understands goals and intent of therapy; neutral about participation     Kierra Benito M.S., [address] [facility]  Speech-Language Pathologist SHERIDAN

## 2020-10-16 NOTE — DISCHARGE NOTE OB - PATIENT PORTAL LINK FT
You can access the FollowMyHealth Patient Portal offered by [facility] by registering at the following website: [email or web address]. By joining [facility]’s FollowMyHealth portal, you will also be able to view your health information using other applications (apps) compatible with our system.

## 2020-10-16 NOTE — DISCHARGE NOTE OB - MATERIALS PROVIDED
Tdap Vaccination (VIS Pub Date: January 24, 2012)/Shaken Baby Prevention Handout/Guide to Postpartum Care/Birth Certificate Instructions/MMR Vaccination (VIS Pub Date: April 20, 2012)

## 2020-10-16 NOTE — DISCHARGE NOTE OB - MEDICATION SUMMARY - MEDICATIONS TO TAKE
I will START or STAY ON the medications listed below when I get home from the hospital:  None I will START or STAY ON the medications listed below when I get home from the hospital:    Oxaydo 5 mg oral tablet  -- 1 tab(s) by mouth every 6 hours  -- Indication: For Pain    ibuprofen 600 mg oral tablet  -- 1 tab(s) by mouth every 6 hours  -- Indication: For Pain    acetaminophen 325 mg oral tablet  -- 3 tab(s) by mouth   -- Indication: For Pain

## 2020-10-16 NOTE — OB PROVIDER TRIAGE NOTE - NSMATERNALFETALCONCERNS_OBGYN_ALL_OB_FT
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

## 2020-10-16 NOTE — OB RN TRIAGE NOTE - PMH
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

## 2020-10-16 NOTE — OB PROVIDER DELIVERY SUMMARY - NSPROVIDERDELIVERYNOTE_OBGYN_ALL_OB_FT
Crash c/s for fetal bradycardia. General anesthesia  Viable male  Grossly normal adnexa Crash c/s for fetal bradycardia. General anesthesia  Viable male infant, Apgars 3, 9, weight = 3130g, cephalic presentation  Grossly normal adnexa  ebl 800ml, IVF 1200ml, UOP 50ml

## 2020-10-16 NOTE — DISCHARGE NOTE OB - HOSPITAL COURSE
Pt with h/o fetal duodenal atresia and polyhydramnios in early labor. Crash c/s for fetal bradycardia. Viable male

## 2020-10-17 LAB
BASOPHILS # BLD AUTO: 0.01 K/UL — SIGNIFICANT CHANGE UP (ref 0–0.2)
BASOPHILS # BLD AUTO: 0.02 K/UL — SIGNIFICANT CHANGE UP (ref 0–0.2)
BASOPHILS NFR BLD AUTO: 0.1 % — SIGNIFICANT CHANGE UP (ref 0–2)
BASOPHILS NFR BLD AUTO: 0.2 % — SIGNIFICANT CHANGE UP (ref 0–2)
EOSINOPHIL # BLD AUTO: 0 K/UL — SIGNIFICANT CHANGE UP (ref 0–0.5)
EOSINOPHIL # BLD AUTO: 0 K/UL — SIGNIFICANT CHANGE UP (ref 0–0.5)
EOSINOPHIL NFR BLD AUTO: 0 % — SIGNIFICANT CHANGE UP (ref 0–6)
EOSINOPHIL NFR BLD AUTO: 0 % — SIGNIFICANT CHANGE UP (ref 0–6)
HCT VFR BLD CALC: 32.8 % — LOW (ref 34.5–45)
HCT VFR BLD CALC: 35.8 % — SIGNIFICANT CHANGE UP (ref 34.5–45)
HGB BLD-MCNC: 10.7 G/DL — LOW (ref 11.5–15.5)
HGB BLD-MCNC: 11.9 G/DL — SIGNIFICANT CHANGE UP (ref 11.5–15.5)
IMM GRANULOCYTES NFR BLD AUTO: 0.2 % — SIGNIFICANT CHANGE UP (ref 0–1.5)
IMM GRANULOCYTES NFR BLD AUTO: 0.4 % — SIGNIFICANT CHANGE UP (ref 0–1.5)
LYMPHOCYTES # BLD AUTO: 0.86 K/UL — LOW (ref 1–3.3)
LYMPHOCYTES # BLD AUTO: 1.79 K/UL — SIGNIFICANT CHANGE UP (ref 1–3.3)
LYMPHOCYTES # BLD AUTO: 14.4 % — SIGNIFICANT CHANGE UP (ref 13–44)
LYMPHOCYTES # BLD AUTO: 5.9 % — LOW (ref 13–44)
MCHC RBC-ENTMCNC: 29.8 PG — SIGNIFICANT CHANGE UP (ref 27–34)
MCHC RBC-ENTMCNC: 30.2 PG — SIGNIFICANT CHANGE UP (ref 27–34)
MCHC RBC-ENTMCNC: 32.6 % — SIGNIFICANT CHANGE UP (ref 32–36)
MCHC RBC-ENTMCNC: 33.2 % — SIGNIFICANT CHANGE UP (ref 32–36)
MCV RBC AUTO: 90.9 FL — SIGNIFICANT CHANGE UP (ref 80–100)
MCV RBC AUTO: 91.4 FL — SIGNIFICANT CHANGE UP (ref 80–100)
MONOCYTES # BLD AUTO: 0.46 K/UL — SIGNIFICANT CHANGE UP (ref 0–0.9)
MONOCYTES # BLD AUTO: 0.86 K/UL — SIGNIFICANT CHANGE UP (ref 0–0.9)
MONOCYTES NFR BLD AUTO: 3.1 % — SIGNIFICANT CHANGE UP (ref 2–14)
MONOCYTES NFR BLD AUTO: 6.9 % — SIGNIFICANT CHANGE UP (ref 2–14)
NEUTROPHILS # BLD AUTO: 13.26 K/UL — HIGH (ref 1.8–7.4)
NEUTROPHILS # BLD AUTO: 9.72 K/UL — HIGH (ref 1.8–7.4)
NEUTROPHILS NFR BLD AUTO: 78.3 % — HIGH (ref 43–77)
NEUTROPHILS NFR BLD AUTO: 90.5 % — HIGH (ref 43–77)
NRBC # FLD: 0 K/UL — SIGNIFICANT CHANGE UP (ref 0–0)
NRBC # FLD: 0 K/UL — SIGNIFICANT CHANGE UP (ref 0–0)
PLATELET # BLD AUTO: 156 K/UL — SIGNIFICANT CHANGE UP (ref 150–400)
PLATELET # BLD AUTO: 167 K/UL — SIGNIFICANT CHANGE UP (ref 150–400)
PMV BLD: 11.2 FL — SIGNIFICANT CHANGE UP (ref 7–13)
PMV BLD: 11.3 FL — SIGNIFICANT CHANGE UP (ref 7–13)
RBC # BLD: 3.59 M/UL — LOW (ref 3.8–5.2)
RBC # BLD: 3.94 M/UL — SIGNIFICANT CHANGE UP (ref 3.8–5.2)
RBC # FLD: 12.5 % — SIGNIFICANT CHANGE UP (ref 10.3–14.5)
RBC # FLD: 12.7 % — SIGNIFICANT CHANGE UP (ref 10.3–14.5)
T PALLIDUM AB TITR SER: NEGATIVE — SIGNIFICANT CHANGE UP
WBC # BLD: 12.42 K/UL — HIGH (ref 3.8–10.5)
WBC # BLD: 14.65 K/UL — HIGH (ref 3.8–10.5)
WBC # FLD AUTO: 12.42 K/UL — HIGH (ref 3.8–10.5)
WBC # FLD AUTO: 14.65 K/UL — HIGH (ref 3.8–10.5)

## 2020-10-17 RX ORDER — SODIUM CHLORIDE 9 MG/ML
500 INJECTION, SOLUTION INTRAVENOUS ONCE
Refills: 0 | Status: COMPLETED | OUTPATIENT
Start: 2020-10-17 | End: 2020-10-17

## 2020-10-17 RX ORDER — ACETAMINOPHEN 500 MG
2 TABLET ORAL
Qty: 0 | Refills: 0 | DISCHARGE

## 2020-10-17 RX ORDER — HEPARIN SODIUM 5000 [USP'U]/ML
10000 INJECTION INTRAVENOUS; SUBCUTANEOUS EVERY 12 HOURS
Refills: 0 | Status: DISCONTINUED | OUTPATIENT
Start: 2020-10-17 | End: 2020-10-19

## 2020-10-17 RX ORDER — HEPARIN SODIUM 5000 [USP'U]/ML
5000 INJECTION INTRAVENOUS; SUBCUTANEOUS EVERY 12 HOURS
Refills: 0 | Status: DISCONTINUED | OUTPATIENT
Start: 2020-10-17 | End: 2020-10-17

## 2020-10-17 RX ORDER — IBUPROFEN 200 MG
1 TABLET ORAL
Qty: 0 | Refills: 0 | DISCHARGE

## 2020-10-17 RX ORDER — ASPIRIN/CALCIUM CARB/MAGNESIUM 324 MG
1 TABLET ORAL
Qty: 0 | Refills: 0 | DISCHARGE

## 2020-10-17 RX ORDER — PSYLLIUM SEED (WITH DEXTROSE)
0 POWDER (GRAM) ORAL
Qty: 0 | Refills: 0 | DISCHARGE

## 2020-10-17 RX ORDER — FERROUS SULFATE 325(65) MG
325 TABLET ORAL DAILY
Refills: 0 | Status: DISCONTINUED | OUTPATIENT
Start: 2020-10-17 | End: 2020-10-19

## 2020-10-17 RX ORDER — SENNA PLUS 8.6 MG/1
1 TABLET ORAL
Refills: 0 | Status: DISCONTINUED | OUTPATIENT
Start: 2020-10-17 | End: 2020-10-19

## 2020-10-17 RX ADMIN — Medication 30 MILLIGRAM(S): at 02:21

## 2020-10-17 RX ADMIN — HEPARIN SODIUM 5000 UNIT(S): 5000 INJECTION INTRAVENOUS; SUBCUTANEOUS at 02:23

## 2020-10-17 RX ADMIN — SODIUM CHLORIDE 1000 MILLILITER(S): 9 INJECTION, SOLUTION INTRAVENOUS at 16:14

## 2020-10-17 RX ADMIN — SIMETHICONE 80 MILLIGRAM(S): 80 TABLET, CHEWABLE ORAL at 17:11

## 2020-10-17 RX ADMIN — Medication 30 MILLIGRAM(S): at 23:30

## 2020-10-17 RX ADMIN — Medication 975 MILLIGRAM(S): at 05:50

## 2020-10-17 RX ADMIN — Medication 30 MILLIGRAM(S): at 10:42

## 2020-10-17 RX ADMIN — HYDROMORPHONE HYDROCHLORIDE 0.5 MILLIGRAM(S): 2 INJECTION INTRAMUSCULAR; INTRAVENOUS; SUBCUTANEOUS at 02:32

## 2020-10-17 RX ADMIN — Medication 30 MILLIGRAM(S): at 02:20

## 2020-10-17 RX ADMIN — Medication 975 MILLIGRAM(S): at 14:00

## 2020-10-17 RX ADMIN — Medication 975 MILLIGRAM(S): at 18:42

## 2020-10-17 RX ADMIN — HEPARIN SODIUM 10000 UNIT(S): 5000 INJECTION INTRAVENOUS; SUBCUTANEOUS at 13:42

## 2020-10-17 RX ADMIN — Medication 100 MILLIGRAM(S): at 13:43

## 2020-10-17 RX ADMIN — Medication 975 MILLIGRAM(S): at 06:49

## 2020-10-17 RX ADMIN — Medication 30 MILLIGRAM(S): at 15:05

## 2020-10-17 RX ADMIN — Medication 975 MILLIGRAM(S): at 13:18

## 2020-10-17 RX ADMIN — Medication 30 MILLIGRAM(S): at 22:46

## 2020-10-17 RX ADMIN — Medication 30 MILLIGRAM(S): at 15:30

## 2020-10-17 RX ADMIN — Medication 100 MILLIGRAM(S): at 04:37

## 2020-10-17 RX ADMIN — Medication 30 MILLIGRAM(S): at 09:42

## 2020-10-17 RX ADMIN — MAGNESIUM HYDROXIDE 30 MILLILITER(S): 400 TABLET, CHEWABLE ORAL at 13:18

## 2020-10-17 RX ADMIN — Medication 975 MILLIGRAM(S): at 19:32

## 2020-10-17 NOTE — CHART NOTE - NSCHARTNOTEFT_GEN_A_CORE
OB Postpartum Note     Patient seen and evaluated at bedside for "inability to move". Patient denies weakness or numbness, but it is difficult for her to move 2/2 pain. She has 4/4 strength in all 4 extremities but has difficulty moving her legs to sit or stand because of her pain. Patient states she believes that she would be able to move after taking her pain medication. She has been wearing her SCDs (turned on) while in bed.     Plan:   - notified nurse to give patient scheduled toradol (due at 8am, at time of assessment 9am had not yet been given)  - encouraged patient to attempt ambulation with help from nurse after receiving analgesia  - SCDs while in bed  - reassess if pain does not improve after medication    ALIZA Solis, PGY1 OB Postpartum Note     Patient seen and evaluated at bedside for "inability to move". Patient denies weakness or numbness, but it is difficult for her to move 2/2 pain. She has 4/4 strength in all 4 extremities but has difficulty moving her legs to sit or stand because of her pain. Patient states she believes that she would be able to move after taking her pain medication. She has been wearing her SCDs (turned on) while in bed.     Plan:   - notified nurse to give patient scheduled toradol (due at 8am, at time of assessment 9am had not yet been given)  - encouraged patient to attempt ambulation with help from nurse after receiving analgesia  - SCDs while in bed  - reassess if pain does not improve after medication    ALIZA Solis, PGY1  D/w Dr. Malhotra

## 2020-10-17 NOTE — PROGRESS NOTE ADULT - SUBJECTIVE AND OBJECTIVE BOX
ANESTHESIA POSTOP CHECK    30y Female POSTOP DAY 1     Vital Signs Last 24 Hrs  T(C): 36.8 (17 Oct 2020 05:27), Max: 37.5 (16 Oct 2020 14:19)  T(F): 98.3 (17 Oct 2020 05:27), Max: 99.5 (16 Oct 2020 14:19)  HR: 73 (17 Oct 2020 05:27) (71 - 109)  BP: 99/50 (17 Oct 2020 05:27) (99/50 - 137/71)  BP(mean): 75 (17 Oct 2020 01:30) (67 - 89)  RR: 16 (17 Oct 2020 05:27) (13 - 22)  SpO2: 98% (17 Oct 2020 05:27) (89% - 99%)  I&O's Summary    16 Oct 2020 07:01  -  17 Oct 2020 07:00  --------------------------------------------------------  IN: 2150 mL / OUT: 1537 mL / NET: 613 mL        [X ] NO APPARENT ANESTHESIA COMPLICATIONS      Comments: Pt does complain of extreme generalized weakness without other complaint. RN & OB aware & addressing.

## 2020-10-17 NOTE — PROVIDER CONTACT NOTE (OTHER) - ACTION/TREATMENT ORDERED:
Dr. Malhotra evaluated pt. at bedside.  Per Dr. Malhotra, give 500 mL bolus of LR and continue to monitor.

## 2020-10-17 NOTE — PROGRESS NOTE ADULT - SUBJECTIVE AND OBJECTIVE BOX
Postop Day  __1_ s/p   C- Section    THERAPY:  [  ] Spinal morphine   [ x ] Epidural morphine   [  ] IV PCA Hydromorphone 1 mg/ml    acetaminophen   Tablet .. 975 milliGRAM(s) Oral <User Schedule>  ceFAZolin   IVPB 2000 milliGRAM(s) IV Intermittent every 8 hours  diphenhydrAMINE 25 milliGRAM(s) Oral every 6 hours PRN  diphtheria/tetanus/pertussis (acellular) Vaccine (ADAcel) 0.5 milliLiter(s) IntraMuscular once  heparin   Injectable 5000 Unit(s) SubCutaneous every 12 hours  ibuprofen  Tablet. 600 milliGRAM(s) Oral every 6 hours  influenza   Vaccine 0.5 milliLiter(s) IntraMuscular once  ketorolac   Injectable 30 milliGRAM(s) IV Push every 6 hours  lactated ringers. 1000 milliLiter(s) IV Continuous <Continuous>  lanolin Ointment 1 Application(s) Topical every 6 hours PRN  magnesium hydroxide Suspension 30 milliLiter(s) Oral two times a day PRN  oxyCODONE    IR 5 milliGRAM(s) Oral every 3 hours PRN  oxyCODONE    IR 5 milliGRAM(s) Oral once PRN  oxytocin Infusion 333.333 milliUNIT(s)/Min IV Continuous <Continuous>  simethicone 80 milliGRAM(s) Chew every 4 hours PRN      T(C): 36.8 (10-17-20 @ 05:27), Max: 37.5 (10-16-20 @ 14:19)  HR: 73 (10-17-20 @ 05:27) (71 - 109)  BP: 99/50 (10-17-20 @ 05:27) (99/50 - 137/71)  RR: 16 (10-17-20 @ 05:27) (13 - 22)  SpO2: 98% (10-17-20 @ 05:27) (89% - 99%)    Pain:   ______mild_____ at rest;  _____moderate______with activity    Sedation Score:	  [  ] Alert	    [ x ] Drowsy        [  ] Arousable	[  ] Asleep	[  ] Unresponsive    Side Effects:	  [  ] None	     [  ] Nausea        [ x ] Pruritus        [  ] Weakness   [  ] Numbness        ASSESSMENT/ PLAN  [ x  ] Side effects resolving      [  x ] Patient made aware of PRN meds available     [ x] Discontinue & switch to PRN pain medications        [  ] Continue       Patient states lower extremities feel and move normally. No apparent anesthetic complications. Pt c/o extreme generalized weakness only. Discussed with RN & OB - they are addressing.

## 2020-10-17 NOTE — OB POSTPARTUM EVENT NOTE - NS_EVENTSUMMARY1_OBGYN_ALL_OB_FT
Pt complaint of feeling lightheaded and dizzy. MD contacted for bedside evaluation. MD orders CBC. MD arrives at bedside 0045. At this time pt states "my ears feel closed"

## 2020-10-17 NOTE — PROGRESS NOTE ADULT - SUBJECTIVE AND OBJECTIVE BOX
OB Progress Note:  Delivery, POD#1    S: 29yo POD#1 s/p LTCS . Her pain is well controlled. She has yet to ambulate or pass flatus. Tolerating a regular diet and voiding freely. Feels weakness No headache, RUQ pain, or vision changes.  Denies chest pain, SOB, n/v, fever/chills, or any other concerns. No heavy vaginal bleeding.     O:   Vital Signs Last 24 Hrs  T(C): 37.2 (17 Oct 2020 03:06), Max: 37.5 (16 Oct 2020 14:19)  T(F): 98.9 (17 Oct 2020 03:06), Max: 99.5 (16 Oct 2020 14:19)  HR: 76 (17 Oct 2020 03:06) (71 - 109)  BP: 112/61 (17 Oct 2020 03:06) (100/58 - 137/71)  BP(mean): 75 (17 Oct 2020 01:30) (67 - 89)  RR: 16 (17 Oct 2020 03:06) (13 - 22)  SpO2: 96% (17 Oct 2020 03:06) (89% - 99%)    PE:  General: NAD  Abdomen: soft, discomfort with palpation, incision c/d/i with steri strips  Extremities: No erythema, no pitting edema    Labs:  Blood type: O Positive  Rubella IgG: RPR: Negative                          11.9   14.65<H> >-----------< 167    ( 10-17 @ 00:26 )             35.8                        12.8   15.25<H> >-----------< 190    ( 10- @ 16:50 )             38.9        A/P: 29yo POD#1 s/p LTCS.  Patient is stable and doing well post-operatively.    - Continue regular diet.  - Increase ambulation.  - Continue motrin, tylenol, oxycodone PRN for pain control  - F/u AM CBC    Ansley Grande, PGY1

## 2020-10-17 NOTE — CHART NOTE - NSCHARTNOTEFT_GEN_A_CORE
PGY1 Evaluation Note    Evaluated patient due to feeling dizziness and lightheaded. Patient reports that she feels weak. Denies chest pain, SOB, headache, vision changes, RUQ pain, fevers/chills, or any other acute concerns. Momin catheter is still in place.    BP 110s/60, HR 80s, O2 sat 91-93%  Gen NAD  Pulm CTAB  Abd fundus firm, bandage c/d/i  Ext SCDs in place, mild edema    A/P:  POD#1 s/p pLTCS with 800cc EBL with dizziness/lightheadedness.  - 500cc bolus given  - stat CBC  - encourage incentive spirometer use    d/w Dr. Nguyen Grande PGY1

## 2020-10-18 RX ORDER — ACETAMINOPHEN 500 MG
2 TABLET ORAL
Qty: 0 | Refills: 0 | DISCHARGE
Start: 2020-10-18

## 2020-10-18 RX ORDER — IBUPROFEN 200 MG
600 TABLET ORAL EVERY 6 HOURS
Refills: 0 | Status: DISCONTINUED | OUTPATIENT
Start: 2020-10-18 | End: 2020-10-19

## 2020-10-18 RX ORDER — OXYCODONE HYDROCHLORIDE 5 MG/1
1 TABLET ORAL
Qty: 0 | Refills: 0 | DISCHARGE
Start: 2020-10-18

## 2020-10-18 RX ORDER — IBUPROFEN 200 MG
0 TABLET ORAL
Qty: 0 | Refills: 0 | DISCHARGE
Start: 2020-10-18

## 2020-10-18 RX ADMIN — Medication 975 MILLIGRAM(S): at 09:27

## 2020-10-18 RX ADMIN — Medication 975 MILLIGRAM(S): at 06:10

## 2020-10-18 RX ADMIN — Medication 600 MILLIGRAM(S): at 06:42

## 2020-10-18 RX ADMIN — Medication 600 MILLIGRAM(S): at 13:00

## 2020-10-18 RX ADMIN — MAGNESIUM HYDROXIDE 30 MILLILITER(S): 400 TABLET, CHEWABLE ORAL at 17:53

## 2020-10-18 RX ADMIN — Medication 975 MILLIGRAM(S): at 04:17

## 2020-10-18 RX ADMIN — OXYCODONE HYDROCHLORIDE 5 MILLIGRAM(S): 5 TABLET ORAL at 00:14

## 2020-10-18 RX ADMIN — OXYCODONE HYDROCHLORIDE 5 MILLIGRAM(S): 5 TABLET ORAL at 22:10

## 2020-10-18 RX ADMIN — Medication 975 MILLIGRAM(S): at 10:00

## 2020-10-18 RX ADMIN — Medication 325 MILLIGRAM(S): at 12:02

## 2020-10-18 RX ADMIN — Medication 975 MILLIGRAM(S): at 15:32

## 2020-10-18 RX ADMIN — Medication 600 MILLIGRAM(S): at 12:01

## 2020-10-18 RX ADMIN — Medication 1 APPLICATION(S): at 21:12

## 2020-10-18 RX ADMIN — OXYCODONE HYDROCHLORIDE 5 MILLIGRAM(S): 5 TABLET ORAL at 04:17

## 2020-10-18 RX ADMIN — OXYCODONE HYDROCHLORIDE 5 MILLIGRAM(S): 5 TABLET ORAL at 18:42

## 2020-10-18 RX ADMIN — Medication 600 MILLIGRAM(S): at 07:30

## 2020-10-18 RX ADMIN — OXYCODONE HYDROCHLORIDE 5 MILLIGRAM(S): 5 TABLET ORAL at 09:27

## 2020-10-18 RX ADMIN — HEPARIN SODIUM 10000 UNIT(S): 5000 INJECTION INTRAVENOUS; SUBCUTANEOUS at 17:53

## 2020-10-18 RX ADMIN — Medication 1 TABLET(S): at 12:01

## 2020-10-18 RX ADMIN — OXYCODONE HYDROCHLORIDE 5 MILLIGRAM(S): 5 TABLET ORAL at 10:00

## 2020-10-18 RX ADMIN — OXYCODONE HYDROCHLORIDE 5 MILLIGRAM(S): 5 TABLET ORAL at 17:53

## 2020-10-18 RX ADMIN — OXYCODONE HYDROCHLORIDE 5 MILLIGRAM(S): 5 TABLET ORAL at 21:10

## 2020-10-18 RX ADMIN — Medication 975 MILLIGRAM(S): at 16:00

## 2020-10-18 RX ADMIN — HEPARIN SODIUM 10000 UNIT(S): 5000 INJECTION INTRAVENOUS; SUBCUTANEOUS at 06:38

## 2020-10-18 NOTE — PROGRESS NOTE ADULT - ATTENDING COMMENTS
PAtient doing well s/p Emergency C/s due to fetal bradycardia stable POD#2. Pain managed with oral medication. Ambulating, passing flatus and tolerating diet. Infant in NICU. STable for discharge POD#3

## 2020-10-18 NOTE — PROGRESS NOTE ADULT - SUBJECTIVE AND OBJECTIVE BOX
OB Progress Note: pLTCS, POD#2    S: 31yo  POD#2 s/p pLTCS for abnormal fetal status.   Pain is well controlled. She is tolerating a regular diet and passing flatus. She is voiding spontaneously, and ambulating without difficulty. Denies CP/SOB. Denies lightheadedness/dizziness. Denies N/V.    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 975 milliGRAM(s) Oral <User Schedule>  diphtheria/tetanus/pertussis (acellular) Vaccine (ADAcel) 0.5 milliLiter(s) IntraMuscular once  ferrous    sulfate 325 milliGRAM(s) Oral daily  heparin   Injectable 33600 Unit(s) SubCutaneous every 12 hours  ibuprofen  Tablet. 600 milliGRAM(s) Oral every 6 hours  influenza   Vaccine 0.5 milliLiter(s) IntraMuscular once  prenatal multivitamin 1 Tablet(s) Oral daily      MEDICATIONS  (PRN):  diphenhydrAMINE 25 milliGRAM(s) Oral every 6 hours PRN Itching  lanolin Ointment 1 Application(s) Topical every 6 hours PRN Sore Nipples  magnesium hydroxide Suspension 30 milliLiter(s) Oral two times a day PRN Constipation  oxyCODONE    IR 5 milliGRAM(s) Oral once PRN Moderate to Severe Pain (4-10)  oxyCODONE    IR 5 milliGRAM(s) Oral every 3 hours PRN Moderate to Severe Pain (4-10)  senna 1 Tablet(s) Oral two times a day PRN Constipation  simethicone 80 milliGRAM(s) Chew every 4 hours PRN Gas      Labs:  Blood type: O Positive  Rubella IgG: RPR: Negative                          10.7<L>   12.42<H> >-----------< 156    ( 10-17 @ 05:45 )             32.8<L>                        11.9   14.65<H> >-----------< 167    ( 10-17 @ 00:26 )             35.8                        12.8   15.25<H> >-----------< 190    ( 10-16 @ 16:50 )             38.9    PE:  General: NAD  Resp: lungs CTA  Cardiac: RRR   Abdomen: Soft, nontender, nondistended  Fundus: firm, appropriately tender  Incision: steri strips c/d/i, site benign  Lochia: scant, wnl  Extremities: bilateral equal trace LE edema noted; no erythema, no calf tenderness    A/P: 31yo  POD#2 s/p pLTCS.    Patient is stable and doing well post-operatively.    - Continue regular diet.  - Increase ambulation.  - Continue motrin, tylenol, oxycodone PRN for pain control.

## 2020-10-19 VITALS
TEMPERATURE: 98 F | RESPIRATION RATE: 17 BRPM | OXYGEN SATURATION: 100 % | HEART RATE: 72 BPM | SYSTOLIC BLOOD PRESSURE: 119 MMHG | DIASTOLIC BLOOD PRESSURE: 64 MMHG

## 2020-10-19 RX ORDER — ACETAMINOPHEN 500 MG
3 TABLET ORAL
Qty: 0 | Refills: 0 | DISCHARGE
Start: 2020-10-19

## 2020-10-19 RX ORDER — IBUPROFEN 200 MG
1 TABLET ORAL
Qty: 40 | Refills: 0
Start: 2020-10-19 | End: 2020-10-28

## 2020-10-19 RX ORDER — ACETAMINOPHEN 500 MG
2 TABLET ORAL
Qty: 80 | Refills: 0
Start: 2020-10-19 | End: 2020-10-28

## 2020-10-19 RX ORDER — IBUPROFEN 200 MG
600 TABLET ORAL
Qty: 24000 | Refills: 0
Start: 2020-10-19 | End: 2020-10-28

## 2020-10-19 RX ADMIN — OXYCODONE HYDROCHLORIDE 5 MILLIGRAM(S): 5 TABLET ORAL at 07:11

## 2020-10-19 RX ADMIN — Medication 600 MILLIGRAM(S): at 13:15

## 2020-10-19 RX ADMIN — Medication 975 MILLIGRAM(S): at 10:05

## 2020-10-19 RX ADMIN — SIMETHICONE 80 MILLIGRAM(S): 80 TABLET, CHEWABLE ORAL at 12:44

## 2020-10-19 RX ADMIN — OXYCODONE HYDROCHLORIDE 5 MILLIGRAM(S): 5 TABLET ORAL at 03:24

## 2020-10-19 RX ADMIN — OXYCODONE HYDROCHLORIDE 5 MILLIGRAM(S): 5 TABLET ORAL at 09:35

## 2020-10-19 RX ADMIN — MAGNESIUM HYDROXIDE 30 MILLILITER(S): 400 TABLET, CHEWABLE ORAL at 00:12

## 2020-10-19 RX ADMIN — OXYCODONE HYDROCHLORIDE 5 MILLIGRAM(S): 5 TABLET ORAL at 00:13

## 2020-10-19 RX ADMIN — OXYCODONE HYDROCHLORIDE 5 MILLIGRAM(S): 5 TABLET ORAL at 04:24

## 2020-10-19 RX ADMIN — Medication 975 MILLIGRAM(S): at 16:10

## 2020-10-19 RX ADMIN — Medication 975 MILLIGRAM(S): at 09:33

## 2020-10-19 RX ADMIN — Medication 600 MILLIGRAM(S): at 06:31

## 2020-10-19 RX ADMIN — OXYCODONE HYDROCHLORIDE 5 MILLIGRAM(S): 5 TABLET ORAL at 01:13

## 2020-10-19 RX ADMIN — OXYCODONE HYDROCHLORIDE 5 MILLIGRAM(S): 5 TABLET ORAL at 10:05

## 2020-10-19 RX ADMIN — Medication 975 MILLIGRAM(S): at 16:01

## 2020-10-19 RX ADMIN — OXYCODONE HYDROCHLORIDE 5 MILLIGRAM(S): 5 TABLET ORAL at 06:31

## 2020-10-19 RX ADMIN — OXYCODONE HYDROCHLORIDE 5 MILLIGRAM(S): 5 TABLET ORAL at 13:15

## 2020-10-19 RX ADMIN — Medication 325 MILLIGRAM(S): at 12:44

## 2020-10-19 RX ADMIN — HEPARIN SODIUM 10000 UNIT(S): 5000 INJECTION INTRAVENOUS; SUBCUTANEOUS at 06:30

## 2020-10-19 RX ADMIN — Medication 975 MILLIGRAM(S): at 04:24

## 2020-10-19 RX ADMIN — Medication 1 TABLET(S): at 12:44

## 2020-10-19 RX ADMIN — MAGNESIUM HYDROXIDE 30 MILLILITER(S): 400 TABLET, CHEWABLE ORAL at 12:44

## 2020-10-19 RX ADMIN — Medication 600 MILLIGRAM(S): at 12:45

## 2020-10-19 RX ADMIN — Medication 600 MILLIGRAM(S): at 07:11

## 2020-10-19 RX ADMIN — Medication 975 MILLIGRAM(S): at 03:27

## 2020-10-19 RX ADMIN — OXYCODONE HYDROCHLORIDE 5 MILLIGRAM(S): 5 TABLET ORAL at 12:45

## 2020-10-19 NOTE — PROGRESS NOTE ADULT - SUBJECTIVE AND OBJECTIVE BOX
SUBJECTIVE:  Pain: well controlled  Complaints:none  MILESTONES:  Alert and oriented x 3  [x  ]  Out of bed/ ambulating. [x  ]  Flatus: [ x ]  Postive [  ] Negative   Bowel movement  [  ] Positive [x  ] Negative   Voiding [x  ] Due to void [  ]   Diet: Regular [ x ]  Clears [  ]  NPO [  ]  Infant feeding:  Breast [x  ]   Bottle [  ]  Both [  ]  Feeding related inssues and/or concerns:none    OBJECTIVE:  T(C): 36.8 (10-19-20 @ 05:50), Max: 36.8 (10-19-20 @ 05:50)  HR: 76 (10-19-20 @ 05:50) (75 - 86)  BP: 111/60 (10-19-20 @ 05:50) (104/63 - 115/63)  RR: 18 (10-19-20 @ 05:50) (18 - 18)  SpO2: 99% (10-19-20 @ 05:50) (98% - 99%)  Wt(kg): --      MEDICATIONS  (STANDING):  acetaminophen   Tablet .. 975 milliGRAM(s) Oral <User Schedule>  diphtheria/tetanus/pertussis (acellular) Vaccine (ADAcel) 0.5 milliLiter(s) IntraMuscular once  ferrous    sulfate 325 milliGRAM(s) Oral daily  heparin   Injectable 02427 Unit(s) SubCutaneous every 12 hours  ibuprofen  Tablet. 600 milliGRAM(s) Oral every 6 hours  influenza   Vaccine 0.5 milliLiter(s) IntraMuscular once  prenatal multivitamin 1 Tablet(s) Oral daily    MEDICATIONS  (PRN):  diphenhydrAMINE 25 milliGRAM(s) Oral every 6 hours PRN Itching  lanolin Ointment 1 Application(s) Topical every 6 hours PRN Sore Nipples  magnesium hydroxide Suspension 30 milliLiter(s) Oral two times a day PRN Constipation  oxyCODONE    IR 5 milliGRAM(s) Oral once PRN Moderate to Severe Pain (4-10)  oxyCODONE    IR 5 milliGRAM(s) Oral every 3 hours PRN Moderate to Severe Pain (4-10)  senna 1 Tablet(s) Oral two times a day PRN Constipation  simethicone 80 milliGRAM(s) Chew every 4 hours PRN Gas      ASSESSMENT:  30y  y/o G  7 P  4  PO Day#  3      Delivery: Primary [x  ]    Repeat [  ]                                       Indication of procedure: abnormal FHR  Condition: Stable  Past Medical History significant for: HP    's patient is a 29 y/o EDC 2020 EGA 37 5/  reports of cramping and back pain. Patient reports of fetal movement. Denies loss of fluid, vaginal bleeding.   AP complications:   - GBS status: positive 10/7/2020  - fetal duodenal atresia  - ATU scan 10/15/2020 cephalic, anterior placenta, CHICHI 48.6,  BPP, EFW 2982  Medical History: Denies  Surgical History: D&C x 2  OBGYN History:  2012  5-14, duodenal atresia   2013  7-8  2014  7-15  SAB x 3, D&C x 2 (16 Oct 2020 15:50)    Current Issues:none  Heart:        RRR                      Lungs:clear  Breasts:  Soft [ x ]   Engorged [  ]  Abdomen: Soft [x  ] , distended [  ] nontender [ x ]   Bowel sounds :  Present [ x ]  Absent [  ]   Fundus firm [ x ]  Boggy [  ]  Abdominal incision: Clean, dry and intact [ x ]  Staples [  ] Steri Strips [x  ] Dermabond [  ] Sutures [  ] MEY ( )  Patient wearing abdominal binder for support.  Vaginal: Lochia:  Heavy [  ]  Moderate [  ]   Scant [x  ]  Extremities: Edema [2+  ] negative Kelli's Sign [ x ] Nontender Lm  [x  ] Positive pedal pulses [ x ]  Other relevant physical exam findings:none      PLAN:  Plan: Increase ambulation, analgesia PRN and pain medication protocol standing oxycodone, ibuprofen and acetaminophen.  Diet: Regular diet  Continue routine post-operative and postpartum care.   Discharge Planning [x  ]  Consults:   Social Work [  ] Lacation [  ] Other [  ]

## 2020-10-21 ENCOUNTER — NON-APPOINTMENT (OUTPATIENT)
Age: 30
End: 2020-10-21

## 2020-10-21 ENCOUNTER — APPOINTMENT (OUTPATIENT)
Dept: OBGYN | Facility: CLINIC | Age: 30
End: 2020-10-21

## 2020-10-26 ENCOUNTER — APPOINTMENT (OUTPATIENT)
Dept: OBGYN | Facility: CLINIC | Age: 30
End: 2020-10-26
Payer: COMMERCIAL

## 2020-10-26 VITALS
BODY MASS INDEX: 37.74 KG/M2 | TEMPERATURE: 100 F | SYSTOLIC BLOOD PRESSURE: 120 MMHG | DIASTOLIC BLOOD PRESSURE: 76 MMHG | WEIGHT: 213 LBS | HEIGHT: 63 IN | HEART RATE: 69 BPM

## 2020-10-26 VITALS — TEMPERATURE: 99 F

## 2020-10-26 DIAGNOSIS — Z87.898 PERSONAL HISTORY OF OTHER SPECIFIED CONDITIONS: ICD-10-CM

## 2020-10-26 DIAGNOSIS — O40.3XX0 POLYHYDRAMNIOS, THIRD TRIMESTER, NOT APPLICABLE OR UNSPECIFIED: ICD-10-CM

## 2020-10-26 DIAGNOSIS — O35.9XX0 MATERNAL CARE FOR (SUSPECTED) FETAL ABNORMALITY AND DAMAGE, UNSPECIFIED, NOT APPLICABLE OR UNSPECIFIED: ICD-10-CM

## 2020-10-26 DIAGNOSIS — O35.8XX0 MATERNAL CARE FOR OTHER (SUSPECTED) FETAL ABNORMALITY AND DAMAGE, NOT APPLICABLE OR UNSPECIFIED: ICD-10-CM

## 2020-10-26 DIAGNOSIS — Z34.93 ENCOUNTER FOR SUPERVISION OF NORMAL PREGNANCY, UNSPECIFIED, THIRD TRIMESTER: ICD-10-CM

## 2020-10-26 DIAGNOSIS — M54.9 DORSALGIA, UNSPECIFIED: ICD-10-CM

## 2020-10-26 DIAGNOSIS — O28.3 ABNORMAL ULTRASONIC FINDING ON ANTENATAL SCREENING OF MOTHER: ICD-10-CM

## 2020-10-26 PROBLEM — O02.1 MISSED ABORTION: Chronic | Status: ACTIVE | Noted: 2019-10-31

## 2020-10-26 PROCEDURE — 99213 OFFICE O/P EST LOW 20 MIN: CPT

## 2020-10-26 PROCEDURE — 99072 ADDL SUPL MATRL&STAF TM PHE: CPT

## 2020-10-26 RX ORDER — ASPIRIN 81 MG
81 TABLET, DELAYED RELEASE (ENTERIC COATED) ORAL
Refills: 0 | Status: DISCONTINUED | COMMUNITY
End: 2020-10-26

## 2020-10-26 NOTE — PHYSICAL EXAM
[Appropriately responsive] : appropriately responsive [Alert] : alert [No Acute Distress] : no acute distress [Clear to Auscultation B/L] : clear to auscultation bilaterally [Oriented x3] : oriented x3 [FreeTextEntry5] : bilateral pedal edema [FreeTextEntry7] : steri-strips in place to incision, non-erythematous, non-draining, non-indurated

## 2020-10-27 LAB — SURGICAL PATHOLOGY STUDY: SIGNIFICANT CHANGE UP

## 2020-10-28 ENCOUNTER — INPATIENT (INPATIENT)
Facility: HOSPITAL | Age: 30
LOS: 1 days | Discharge: HOME | End: 2020-10-30
Attending: OBSTETRICS & GYNECOLOGY | Admitting: OBSTETRICS & GYNECOLOGY
Payer: COMMERCIAL

## 2020-10-28 VITALS
RESPIRATION RATE: 16 BRPM | OXYGEN SATURATION: 97 % | SYSTOLIC BLOOD PRESSURE: 128 MMHG | TEMPERATURE: 100 F | DIASTOLIC BLOOD PRESSURE: 71 MMHG | HEIGHT: 63 IN | HEART RATE: 76 BPM

## 2020-10-28 DIAGNOSIS — Z98.890 OTHER SPECIFIED POSTPROCEDURAL STATES: Chronic | ICD-10-CM

## 2020-10-28 LAB
BACTERIA UR CULT: NORMAL
BASE EXCESS BLDV CALC-SCNC: -0.3 MMOL/L — SIGNIFICANT CHANGE UP
BLOOD GAS VENOUS - CREATININE: SIGNIFICANT CHANGE UP MG/DL (ref 0.5–1.3)
CHLORIDE BLDV-SCNC: 111 MMOL/L — HIGH (ref 96–108)
GAS PNL BLDV: 139 MMOL/L — SIGNIFICANT CHANGE UP (ref 136–146)
GLUCOSE BLDV-MCNC: 85 MG/DL — SIGNIFICANT CHANGE UP (ref 70–99)
HCO3 BLDV-SCNC: 23 MMOL/L — SIGNIFICANT CHANGE UP (ref 20–27)
HCT VFR BLDV CALC: 34 % — LOW (ref 34.5–45)
HGB BLDV-MCNC: 11 G/DL — LOW (ref 11.5–15.5)
LACTATE BLDV-MCNC: 1.4 MMOL/L — SIGNIFICANT CHANGE UP (ref 0.5–2)
PCO2 BLDV: 41 MMHG — SIGNIFICANT CHANGE UP (ref 41–51)
PH BLDV: 7.39 PH — SIGNIFICANT CHANGE UP (ref 7.32–7.43)
PO2 BLDV: 32 MMHG — LOW (ref 35–40)
POTASSIUM BLDV-SCNC: 3.7 MMOL/L — SIGNIFICANT CHANGE UP (ref 3.4–4.5)
SAO2 % BLDV: 56.9 % — LOW (ref 60–85)

## 2020-10-28 PROCEDURE — 99284 EMERGENCY DEPT VISIT MOD MDM: CPT

## 2020-10-28 PROCEDURE — 93970 EXTREMITY STUDY: CPT | Mod: 26

## 2020-10-28 NOTE — ED PROVIDER NOTE - OBJECTIVE STATEMENT
Patient is a 30 year old female s/p  12 days ago presenting with fevers and chills that started 4 days prior.  Patient states she was taking ibuprofen/tylenol for relief.  Patient had a fever of 101 today.  Complains of dry cough.  No SOB, chest pain.  Denies any inflammation in her breasts.  Complains of lower abdominal pain, dull, constant improved with ibuprofen/tylenol.  Also c/o back pain.  Denies any abnormal vaginal discharge or foul smelling.  Patient had 1x episode of dysuria.  Denies any nausea/vomiting/diarrhea.

## 2020-10-28 NOTE — ED PROVIDER NOTE - PHYSICAL EXAMINATION
PHYSICAL EXAM:  GENERAL: NAD, well-developed  HEAD:  Atraumatic, Normocephalic  EYES: EOMI, conjunctiva and sclera clear  NECK: Supple,   CHEST/LUNG: Clear to auscultation bilaterally; No wheezes, rales, or ronchi   HEART: Regular rate and rhythm; No murmurs, rubs, or gallops  ABDOMEN: Soft, +tender in RUQ and RLQ and LLQ and lower abdomen; surgical scar with no purulence; Nondistended; Bowel sounds present  EXTREMITIES:  2+ Peripheral Pulses, No clubbing, cyanosis, +pitting edema up to knees   PSYCH: AAOx3  NEUROLOGY: non-focal  SKIN: No rashes or lesions

## 2020-10-28 NOTE — ED ADULT NURSE NOTE - OBJECTIVE STATEMENT
Received PT to room 9, A&Ox3, c/o fever, chills, and swollen b/l lower extremities x4 days, s/p  12 days ago. Lower abdominal incision noted, dressed with steri strips. No drainage, bleeding, or odor noted. Abdomen slightly tender to touch, no abdominal distention. B/L lower extremities also tender to touch, no pitting edema, b/l pedal pulse present, capillary refill +1. Breathing even and unlabored, heart sounds normal. Denies N/V/D, chest pain, SOB, dizziness, dysuria. Left 20g AC placed. Ambulates independently, will continue to monitor.

## 2020-10-28 NOTE — ED PROVIDER NOTE - CLINICAL SUMMARY MEDICAL DECISION MAKING FREE TEXT BOX
30 year old female s/p post  12 days prior presenting with fevers/chills, abdominal pain.  Differential diagnosis includes UTI vs retained products.  Patient c/o of RUQ abd pain so obtain CT abd/pelvis with IV contrast.  Patient with worsening LE edema, symmetrical, obtain LE dopplers to r/o DVT.

## 2020-10-28 NOTE — ED PROVIDER NOTE - NS ED ROS FT
REVIEW OF SYSTEMS:    CONSTITUTIONAL: No weakness; +fevers +chills   EYES/ENT: No visual changes;  No vertigo or throat pain   NECK: No pain or stiffness  RESPIRATORY: No cough, wheezing, hemoptysis; No shortness of breath  CARDIOVASCULAR: No chest pain or palpitations  GASTROINTESTINAL: +abdominal pain;  No nausea, vomiting, or hematemesis; No diarrhea or constipation. No melena or hematochezia.  GENITOURINARY: +dysuria   NEUROLOGICAL: No numbness or weakness  SKIN: No itching, rashes

## 2020-10-28 NOTE — ED PROVIDER NOTE - ATTENDING CONTRIBUTION TO CARE
30F s/p  12 days ago p/w 4 days of fevers and chills. Had been taking RTC tylenol/ibuprofen for symptoms, instructed to stop today by OB and had Tm 101F. _abd pain, dry cough. Breast feeding but does not note any erythema or warmth to breasts and has been feeding regularly. + vaginal bleeding but no clots. No abnormal vaginal discharge or foul smells. Denies n/v/d, dysuria. Had followed with OB who checked surgical site, no issues noted.  Gen: nad  CV: rrr, no murmur  Pulm: clear lungs  Abd: ttp R mid to lower lateral abdomen and around surgical site, no rebound/guarding; surgical incision site clean, dry and well healing  Ext: b/l non-pitting edema with calf tenderness  MDM: 30F POD#12 from  p/w 4 days fever, abd pain and worsening b/l LE swelling - fevers may be 2/2 DVT vs intra-abd pathology, less likely endometritis based on timeline - will get b/l LE doppler and CT ab/pel, check sepsis w/u with blood and urine culures, cbc, cmp, vbg with lactate, IVF and analgesia now - will consult OB, dispo pending w/u

## 2020-10-28 NOTE — ED ADULT TRIAGE NOTE - CHIEF COMPLAINT QUOTE
Patient sent in by MD Richard? Patient reports 12 days post partum  c/o fevers, chills and sweats with no relief of motrin and tylenol. Patient c/o dry cough. denies loss of smell or taste. Denies any drainage to surgical site. Patient c/o discomfort after urinating today. Last took tylenol 730am. Bruce CHRISTIANSEN. L&D called, patient to be seen in ED.

## 2020-10-29 DIAGNOSIS — L02.91 CUTANEOUS ABSCESS, UNSPECIFIED: ICD-10-CM

## 2020-10-29 DIAGNOSIS — Z98.891 HISTORY OF UTERINE SCAR FROM PREVIOUS SURGERY: Chronic | ICD-10-CM

## 2020-10-29 DIAGNOSIS — S30.1XXA CONTUSION OF ABDOMINAL WALL, INITIAL ENCOUNTER: ICD-10-CM

## 2020-10-29 DIAGNOSIS — U07.1 COVID-19: ICD-10-CM

## 2020-10-29 LAB
ALBUMIN SERPL ELPH-MCNC: 3.7 G/DL — SIGNIFICANT CHANGE UP (ref 3.3–5)
ALBUMIN SERPL ELPH-MCNC: 3.9 G/DL — SIGNIFICANT CHANGE UP (ref 3.3–5)
ALP SERPL-CCNC: 166 U/L — HIGH (ref 40–120)
ALP SERPL-CCNC: 182 U/L — HIGH (ref 40–120)
ALT FLD-CCNC: 44 U/L — HIGH (ref 4–33)
ALT FLD-CCNC: 49 U/L — HIGH (ref 4–33)
ANION GAP SERPL CALC-SCNC: 12 MMO/L — SIGNIFICANT CHANGE UP (ref 7–14)
ANION GAP SERPL CALC-SCNC: 12 MMO/L — SIGNIFICANT CHANGE UP (ref 7–14)
APPEARANCE UR: CLEAR — SIGNIFICANT CHANGE UP
AST SERPL-CCNC: 33 U/L — HIGH (ref 4–32)
AST SERPL-CCNC: 37 U/L — HIGH (ref 4–32)
B PERT DNA SPEC QL NAA+PROBE: SIGNIFICANT CHANGE UP
BACTERIA # UR AUTO: NEGATIVE — SIGNIFICANT CHANGE UP
BASOPHILS # BLD AUTO: 0.01 K/UL — SIGNIFICANT CHANGE UP (ref 0–0.2)
BASOPHILS NFR BLD AUTO: 0.2 % — SIGNIFICANT CHANGE UP (ref 0–2)
BILIRUB SERPL-MCNC: < 0.2 MG/DL — LOW (ref 0.2–1.2)
BILIRUB SERPL-MCNC: < 0.2 MG/DL — LOW (ref 0.2–1.2)
BILIRUB UR-MCNC: NEGATIVE — SIGNIFICANT CHANGE UP
BLOOD UR QL VISUAL: HIGH
BUN SERPL-MCNC: 13 MG/DL — SIGNIFICANT CHANGE UP (ref 7–23)
BUN SERPL-MCNC: 9 MG/DL — SIGNIFICANT CHANGE UP (ref 7–23)
C PNEUM DNA SPEC QL NAA+PROBE: SIGNIFICANT CHANGE UP
CALCIUM SERPL-MCNC: 8.6 MG/DL — SIGNIFICANT CHANGE UP (ref 8.4–10.5)
CALCIUM SERPL-MCNC: 8.8 MG/DL — SIGNIFICANT CHANGE UP (ref 8.4–10.5)
CHLORIDE SERPL-SCNC: 104 MMOL/L — SIGNIFICANT CHANGE UP (ref 98–107)
CHLORIDE SERPL-SCNC: 106 MMOL/L — SIGNIFICANT CHANGE UP (ref 98–107)
CK SERPL-CCNC: 83 U/L — SIGNIFICANT CHANGE UP (ref 25–170)
CO2 SERPL-SCNC: 21 MMOL/L — LOW (ref 22–31)
CO2 SERPL-SCNC: 22 MMOL/L — SIGNIFICANT CHANGE UP (ref 22–31)
COLOR SPEC: SIGNIFICANT CHANGE UP
CREAT SERPL-MCNC: 0.49 MG/DL — LOW (ref 0.5–1.3)
CREAT SERPL-MCNC: 0.59 MG/DL — SIGNIFICANT CHANGE UP (ref 0.5–1.3)
CRP SERPL-MCNC: 13.8 MG/L — HIGH
D DIMER BLD IA.RAPID-MCNC: 2921 NG/ML — SIGNIFICANT CHANGE UP
EOSINOPHIL # BLD AUTO: 0.01 K/UL — SIGNIFICANT CHANGE UP (ref 0–0.5)
EOSINOPHIL NFR BLD AUTO: 0.2 % — SIGNIFICANT CHANGE UP (ref 0–6)
FERRITIN SERPL-MCNC: 50.66 NG/ML — SIGNIFICANT CHANGE UP (ref 15–150)
FLUAV H1 2009 PAND RNA SPEC QL NAA+PROBE: SIGNIFICANT CHANGE UP
FLUAV H1 RNA SPEC QL NAA+PROBE: SIGNIFICANT CHANGE UP
FLUAV H3 RNA SPEC QL NAA+PROBE: SIGNIFICANT CHANGE UP
FLUAV SUBTYP SPEC NAA+PROBE: SIGNIFICANT CHANGE UP
FLUBV RNA SPEC QL NAA+PROBE: SIGNIFICANT CHANGE UP
GLUCOSE SERPL-MCNC: 86 MG/DL — SIGNIFICANT CHANGE UP (ref 70–99)
GLUCOSE SERPL-MCNC: 95 MG/DL — SIGNIFICANT CHANGE UP (ref 70–99)
GLUCOSE UR-MCNC: NEGATIVE — SIGNIFICANT CHANGE UP
HADV DNA SPEC QL NAA+PROBE: SIGNIFICANT CHANGE UP
HCOV PNL SPEC NAA+PROBE: SIGNIFICANT CHANGE UP
HCT VFR BLD CALC: 35.6 % — SIGNIFICANT CHANGE UP (ref 34.5–45)
HCT VFR BLD CALC: 35.7 % — SIGNIFICANT CHANGE UP (ref 34.5–45)
HGB BLD-MCNC: 11.6 G/DL — SIGNIFICANT CHANGE UP (ref 11.5–15.5)
HGB BLD-MCNC: 11.7 G/DL — SIGNIFICANT CHANGE UP (ref 11.5–15.5)
HMPV RNA SPEC QL NAA+PROBE: SIGNIFICANT CHANGE UP
HPIV1 RNA SPEC QL NAA+PROBE: SIGNIFICANT CHANGE UP
HPIV2 RNA SPEC QL NAA+PROBE: SIGNIFICANT CHANGE UP
HPIV3 RNA SPEC QL NAA+PROBE: SIGNIFICANT CHANGE UP
HPIV4 RNA SPEC QL NAA+PROBE: SIGNIFICANT CHANGE UP
HYALINE CASTS # UR AUTO: NEGATIVE — SIGNIFICANT CHANGE UP
IMM GRANULOCYTES NFR BLD AUTO: 0.5 % — SIGNIFICANT CHANGE UP (ref 0–1.5)
KETONES UR-MCNC: NEGATIVE — SIGNIFICANT CHANGE UP
LDH SERPL L TO P-CCNC: 236 U/L — HIGH (ref 135–225)
LEUKOCYTE ESTERASE UR-ACNC: SIGNIFICANT CHANGE UP
LYMPHOCYTES # BLD AUTO: 2.51 K/UL — SIGNIFICANT CHANGE UP (ref 1–3.3)
LYMPHOCYTES # BLD AUTO: 42.1 % — SIGNIFICANT CHANGE UP (ref 13–44)
MAGNESIUM SERPL-MCNC: 1.7 MG/DL — SIGNIFICANT CHANGE UP (ref 1.6–2.6)
MCHC RBC-ENTMCNC: 29.1 PG — SIGNIFICANT CHANGE UP (ref 27–34)
MCHC RBC-ENTMCNC: 29.2 PG — SIGNIFICANT CHANGE UP (ref 27–34)
MCHC RBC-ENTMCNC: 32.5 % — SIGNIFICANT CHANGE UP (ref 32–36)
MCHC RBC-ENTMCNC: 32.9 % — SIGNIFICANT CHANGE UP (ref 32–36)
MCV RBC AUTO: 88.8 FL — SIGNIFICANT CHANGE UP (ref 80–100)
MCV RBC AUTO: 89.5 FL — SIGNIFICANT CHANGE UP (ref 80–100)
MONOCYTES # BLD AUTO: 0.51 K/UL — SIGNIFICANT CHANGE UP (ref 0–0.9)
MONOCYTES NFR BLD AUTO: 8.6 % — SIGNIFICANT CHANGE UP (ref 2–14)
NEUTROPHILS # BLD AUTO: 2.89 K/UL — SIGNIFICANT CHANGE UP (ref 1.8–7.4)
NEUTROPHILS NFR BLD AUTO: 48.4 % — SIGNIFICANT CHANGE UP (ref 43–77)
NITRITE UR-MCNC: NEGATIVE — SIGNIFICANT CHANGE UP
NRBC # FLD: 0 K/UL — SIGNIFICANT CHANGE UP (ref 0–0)
NRBC # FLD: 0 K/UL — SIGNIFICANT CHANGE UP (ref 0–0)
PH UR: 6.5 — SIGNIFICANT CHANGE UP (ref 5–8)
PHOSPHATE SERPL-MCNC: 2.8 MG/DL — SIGNIFICANT CHANGE UP (ref 2.5–4.5)
PLATELET # BLD AUTO: 334 K/UL — SIGNIFICANT CHANGE UP (ref 150–400)
PLATELET # BLD AUTO: 357 K/UL — SIGNIFICANT CHANGE UP (ref 150–400)
PMV BLD: 9.3 FL — SIGNIFICANT CHANGE UP (ref 7–13)
PMV BLD: 9.5 FL — SIGNIFICANT CHANGE UP (ref 7–13)
POTASSIUM SERPL-MCNC: 3.5 MMOL/L — SIGNIFICANT CHANGE UP (ref 3.5–5.3)
POTASSIUM SERPL-MCNC: 4.1 MMOL/L — SIGNIFICANT CHANGE UP (ref 3.5–5.3)
POTASSIUM SERPL-SCNC: 3.5 MMOL/L — SIGNIFICANT CHANGE UP (ref 3.5–5.3)
POTASSIUM SERPL-SCNC: 4.1 MMOL/L — SIGNIFICANT CHANGE UP (ref 3.5–5.3)
PROT SERPL-MCNC: 6.7 G/DL — SIGNIFICANT CHANGE UP (ref 6–8.3)
PROT SERPL-MCNC: 7 G/DL — SIGNIFICANT CHANGE UP (ref 6–8.3)
PROT UR-MCNC: 10 — SIGNIFICANT CHANGE UP
RAPID RVP RESULT: DETECTED
RBC # BLD: 3.99 M/UL — SIGNIFICANT CHANGE UP (ref 3.8–5.2)
RBC # BLD: 4.01 M/UL — SIGNIFICANT CHANGE UP (ref 3.8–5.2)
RBC # FLD: 12.6 % — SIGNIFICANT CHANGE UP (ref 10.3–14.5)
RBC # FLD: 12.8 % — SIGNIFICANT CHANGE UP (ref 10.3–14.5)
RBC CASTS # UR COMP ASSIST: HIGH (ref 0–?)
RV+EV RNA SPEC QL NAA+PROBE: SIGNIFICANT CHANGE UP
SARS-COV-2 RNA SPEC QL NAA+PROBE: DETECTED
SODIUM SERPL-SCNC: 137 MMOL/L — SIGNIFICANT CHANGE UP (ref 135–145)
SODIUM SERPL-SCNC: 140 MMOL/L — SIGNIFICANT CHANGE UP (ref 135–145)
SP GR SPEC: 1.02 — SIGNIFICANT CHANGE UP (ref 1–1.04)
SQUAMOUS # UR AUTO: SIGNIFICANT CHANGE UP
UROBILINOGEN FLD QL: NORMAL — SIGNIFICANT CHANGE UP
WBC # BLD: 5.96 K/UL — SIGNIFICANT CHANGE UP (ref 3.8–10.5)
WBC # BLD: 6.49 K/UL — SIGNIFICANT CHANGE UP (ref 3.8–10.5)
WBC # FLD AUTO: 5.96 K/UL — SIGNIFICANT CHANGE UP (ref 3.8–10.5)
WBC # FLD AUTO: 6.49 K/UL — SIGNIFICANT CHANGE UP (ref 3.8–10.5)
WBC UR QL: >50 — HIGH (ref 0–?)

## 2020-10-29 PROCEDURE — 99222 1ST HOSP IP/OBS MODERATE 55: CPT | Mod: GC

## 2020-10-29 PROCEDURE — 71045 X-RAY EXAM CHEST 1 VIEW: CPT | Mod: 26

## 2020-10-29 PROCEDURE — 74177 CT ABD & PELVIS W/CONTRAST: CPT | Mod: 26

## 2020-10-29 RX ORDER — ACETAMINOPHEN 500 MG
975 TABLET ORAL EVERY 6 HOURS
Refills: 0 | Status: DISCONTINUED | OUTPATIENT
Start: 2020-10-29 | End: 2020-10-30

## 2020-10-29 RX ORDER — IBUPROFEN 200 MG
600 TABLET ORAL EVERY 6 HOURS
Refills: 0 | Status: DISCONTINUED | OUTPATIENT
Start: 2020-10-29 | End: 2020-10-29

## 2020-10-29 RX ORDER — OXYCODONE HYDROCHLORIDE 5 MG/1
5 TABLET ORAL EVERY 6 HOURS
Refills: 0 | Status: DISCONTINUED | OUTPATIENT
Start: 2020-10-29 | End: 2020-10-30

## 2020-10-29 RX ORDER — INFLUENZA VIRUS VACCINE 15; 15; 15; 15 UG/.5ML; UG/.5ML; UG/.5ML; UG/.5ML
0.5 SUSPENSION INTRAMUSCULAR ONCE
Refills: 0 | Status: DISCONTINUED | OUTPATIENT
Start: 2020-10-29 | End: 2020-10-30

## 2020-10-29 RX ORDER — SIMETHICONE 80 MG/1
80 TABLET, CHEWABLE ORAL EVERY 6 HOURS
Refills: 0 | Status: DISCONTINUED | OUTPATIENT
Start: 2020-10-29 | End: 2020-10-30

## 2020-10-29 RX ORDER — HEPARIN SODIUM 5000 [USP'U]/ML
5000 INJECTION INTRAVENOUS; SUBCUTANEOUS EVERY 8 HOURS
Refills: 0 | Status: DISCONTINUED | OUTPATIENT
Start: 2020-10-29 | End: 2020-10-30

## 2020-10-29 RX ORDER — ONDANSETRON 8 MG/1
8 TABLET, FILM COATED ORAL EVERY 8 HOURS
Refills: 0 | Status: DISCONTINUED | OUTPATIENT
Start: 2020-10-29 | End: 2020-10-30

## 2020-10-29 RX ORDER — ERTAPENEM SODIUM 1 G/1
1000 INJECTION, POWDER, LYOPHILIZED, FOR SOLUTION INTRAMUSCULAR; INTRAVENOUS EVERY 24 HOURS
Refills: 0 | Status: DISCONTINUED | OUTPATIENT
Start: 2020-10-29 | End: 2020-10-30

## 2020-10-29 RX ADMIN — HEPARIN SODIUM 5000 UNIT(S): 5000 INJECTION INTRAVENOUS; SUBCUTANEOUS at 05:07

## 2020-10-29 RX ADMIN — ERTAPENEM SODIUM 120 MILLIGRAM(S): 1 INJECTION, POWDER, LYOPHILIZED, FOR SOLUTION INTRAMUSCULAR; INTRAVENOUS at 03:49

## 2020-10-29 NOTE — PROGRESS NOTE ADULT - SUBJECTIVE AND OBJECTIVE BOX
OB/GYN Progress Note  POD#13   HD#2    S: Patient seen and examined at bedside. No acute events overnight. No acute complaints. Pain well controlled. Patient is ambulating and tolerating a regular diet. Patient is passing flatus. Patient is voiding spontaneously. Denies CP, SOB, N/V, fevers, and chills.    Vital Signs Last 24 Hours  T(C): 37.2 (10-29-20 @ 10:00), Max: 37.8 (10-28-20 @ 22:15)  HR: 86 (10-29-20 @ 10:00) (65 - 86)  BP: 120/75 (10-29-20 @ 10:00) (120/75 - 151/83)  RR: 19 (10-29-20 @ 10:00) (16 - 19)  SpO2: 96% (10-29-20 @ 10:00) (96% - 99%)    I&O's Summary    28 Oct 2020 07:01  -  29 Oct 2020 07:00  --------------------------------------------------------  IN: 118 mL / OUT: 0 mL / NET: 118 mL        Physical Exam:  General: NAD  CV: RRR  Lungs: CTAB  Abdomen: soft, minimally-tender, softly distended, normoactive bowel sounds  Incision: CDI  Ext: no pain or swelling     Labs:                        11.6   5.96  )-----------( 334      ( 29 Oct 2020 07:25 )             35.7   baso 0.2    eos 0.2    imm gran 0.5    lymph 42.1   mono 8.6    poly 48.4                         11.7   6.49  )-----------( 357      ( 28 Oct 2020 22:40 )             35.6   baso x      eos x      imm gran x      lymph x      mono x      poly x          MEDICATIONS  (STANDING):  ertapenem  IVPB 1000 milliGRAM(s) IV Intermittent every 24 hours  heparin   Injectable 5000 Unit(s) SubCutaneous every 8 hours  influenza   Vaccine 0.5 milliLiter(s) IntraMuscular once    MEDICATIONS  (PRN):  acetaminophen   Tablet .. 975 milliGRAM(s) Oral every 6 hours PRN Moderate Pain (4 - 6)  ondansetron    Tablet 8 milliGRAM(s) Oral every 8 hours PRN Nausea and/or Vomiting  oxyCODONE    IR 5 milliGRAM(s) Oral every 6 hours PRN Severe Pain (7 - 10)  simethicone 80 milliGRAM(s) Chew every 6 hours PRN Gas     OB/GYN Progress Note  POD#13   HD#2    BACK ENTERED DUE TO CLINICAL ACTIVITY  PATIENT SEEN AND EVALUATED AT 7am    S: Patient seen and examined at bedside. No acute events overnight. No acute complaints. Pain well controlled. Patient is ambulating and tolerating a regular diet. Patient is passing flatus. Patient is voiding spontaneously. Denies CP, SOB, N/V, fevers, and chills.    Vital Signs Last 24 Hours  T(C): 37.2 (10-29-20 @ 10:00), Max: 37.8 (10-28-20 @ 22:15)  HR: 86 (10-29-20 @ 10:00) (65 - 86)  BP: 120/75 (10-29-20 @ 10:00) (120/75 - 151/83)  RR: 19 (10-29-20 @ 10:00) (16 - 19)  SpO2: 96% (10-29-20 @ 10:00) (96% - 99%)    I&O's Summary    28 Oct 2020 07:01  -  29 Oct 2020 07:00  --------------------------------------------------------  IN: 118 mL / OUT: 0 mL / NET: 118 mL        Physical Exam:  General: NAD  CV: RRR  Lungs: CTAB  Abdomen: soft, minimally-tender, softly distended, normoactive bowel sounds  Incision: CDI  Ext: no pain or swelling     Labs:                        11.6   5.96  )-----------( 334      ( 29 Oct 2020 07:25 )             35.7   baso 0.2    eos 0.2    imm gran 0.5    lymph 42.1   mono 8.6    poly 48.4                         11.7   6.49  )-----------( 357      ( 28 Oct 2020 22:40 )             35.6   baso x      eos x      imm gran x      lymph x      mono x      poly x          MEDICATIONS  (STANDING):  ertapenem  IVPB 1000 milliGRAM(s) IV Intermittent every 24 hours  heparin   Injectable 5000 Unit(s) SubCutaneous every 8 hours  influenza   Vaccine 0.5 milliLiter(s) IntraMuscular once    MEDICATIONS  (PRN):  acetaminophen   Tablet .. 975 milliGRAM(s) Oral every 6 hours PRN Moderate Pain (4 - 6)  ondansetron    Tablet 8 milliGRAM(s) Oral every 8 hours PRN Nausea and/or Vomiting  oxyCODONE    IR 5 milliGRAM(s) Oral every 6 hours PRN Severe Pain (7 - 10)  simethicone 80 milliGRAM(s) Chew every 6 hours PRN Gas

## 2020-10-29 NOTE — H&P ADULT - PROBLEM SELECTOR PLAN 1
#Neuro: pain well controlled w/ postpartum PO regimen  #Postpartum state: breast pump prn   #CV: Hemodynamically stable, H/H wnl  #Pulm: 100% sat on RA, ROS unremarkable   #ID: Invanz qd, Afebrile, Covid (10/28): neg  #GI: tolerating reg diet, no n/v. currently NPO in anticipation of possible IR drainage  #: pt voiding spontaneously, monitor UOP  #FEN: SLIV   #Heme: HSQ, SCDs while in bed, and early ambualtion encouraged for DVT prophylaxis    Disposition: countine routine care      Plan discussed w/ Dr. Shoaib Britt MD  OBGYN PGY2 #Neuro: pain well controlled w/ postpartum PO regimen  #Postpartum state: minimal lochia, breast feeding, infant in NICU, pump prn   #CV: Hemodynamically stable, H/H wnl  #Pulm: 100% sat on RA, ROS unremarkable   #ID: Invanz qd, Afebrile, Covid (10/28): neg  #GI: tolerating reg diet, no n/v  #: pt voiding spontaneously, monitor UOP  #FEN: SLIV   #Heme: HSQ, SCDs while in bed, and early ambualtion encouraged for DVT prophylaxis    Disposition: countine routine care      Plan discussed w/ Dr. Shoaib Birtt MD  OBGYN PGY2

## 2020-10-29 NOTE — H&P ADULT - NSHPPHYSICALEXAM_GEN_ALL_CORE
Constitutional: NAD, awake and alert, well-developed  HEENT: Normal Hearing  Neck: Soft and supple  Respiratory: Breath sounds are clear bilaterally, No wheezing, rales or rhonchi  Cardiovascular: S1 and S2, regular rate and rhythm, no Murmurs, gallops or rubs  Breast: engorged, no erythema or evidence of mastitis  Gastrointestinal: obese, soft, mid and lower abdomen tender to palpation, nondistended, no guarding, no rebound  Genitourinary: minimal vaginal bleeding on pad    Extremities: 3+ peripheral edema, tender to touch   Vascular: 2+ peripheral pulses  Neurological: A/O x 3, no focal deficits  Skin: erythema on rashes L inner thigh and popliteal fossa

## 2020-10-29 NOTE — H&P ADULT - NSICDXPASTMEDICALHX_GEN_ALL_CORE_FT
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

## 2020-10-29 NOTE — PROGRESS NOTE ADULT - ASSESSMENT
30y  POD#13 status post pLTCS who presented to the ED with a chief complaint of subjective fever and chills for the past 5 days. VSS in the ED. CT A/P notable for a 6.7 x 2.1 cm fluid collection in the right rectus sheath, concerning for abscess. Patient started on Invanz. No leukocytosis. Mild transaminitis. Patient found to be COVID positive. Unclear etiology of fevers.     Neuro: Tylenol. Oxy PRN  CV: Hemodynamically stable  Pulm: Saturating well on room air, encourage ambulation. CXR to eval for possible lung consolidations per MFM recommendation.   GI: Continue regular diet  :  Voiding spontaneously   Heme: c/w HSQ and SCDs for DVT ppx  FEN: LR@125. replete electrolytes prn   ID: Febrile at home. Afebrile here. No leukocytosis. CT A/P with 6.7 x 2.1 cm fluid collection in the right rectus sheath, concerning for abscess. c/w Invanz. IR consult per MFM recommendation.   Endo: No active issues   Dispo: Continue inpatient care    EDUARD Baeza PGY3 30y  POD#13 status post pLTCS who presented to the ED with a chief complaint of subjective fever and chills for the past 5 days. VSS in the ED. CT A/P notable for a 6.7 x 2.1 cm fluid collection in the right rectus sheath, concerning for abscess. Patient started on Invanz. No leukocytosis. Mild transaminitis. Patient found to be COVID positive. Unclear etiology of fevers.     Neuro: Tylenol. Oxy PRN  CV: Hemodynamically stable  Pulm: Saturating well on room air, encourage ambulation. CXR to eval for possible lung consolidations per MFM recommendation.   GI: Continue regular diet  :  Voiding spontaneously   Heme: c/w HSQ and SCDs for DVT ppx  FEN: LR@125. replete electrolytes prn   ID: Febrile at home. Afebrile here. No leukocytosis. CT A/P with 6.7 x 2.1 cm fluid collection in the right rectus sheath, concerning for abscess. c/w Invanz. IR consult per MFM recommendation.   Endo: No active issues   Dispo: Continue inpatient care    EDUARD Baeza PGY3  d/w Dr. Wagner

## 2020-10-29 NOTE — H&P ADULT - HISTORY OF PRESENT ILLNESS
***INCOMPLETE ***    BLANCA KUHN  30y  who presents with a chief complaint of subjective fever and chills for the past 5 days and record fever of 101.0 at home on 10/28. She states she has been having fever, chills, and sweating for the past several days. She endorses mild dysuria for the past few days but otherwise denies any recent illness, breastfeeds regularly and denies breast tenderness or apin      OB/GYN HISTORY:     POD#13 status post pLTCS   MARGARETH KUHN  30y  who presents with a chief complaint of subjective fever and chills for the past 5 days and recorded fever of 101.0 at home on 10/28. She states she has been having fever, chills, and sweating for the past several days. She endorses mild dysuria for the past few days but otherwise denies any recent illness, breastfeeds regularly and denies breast tenderness or apin      OB/GYN HISTORY:     POD#13 status post pLTCS   BLANCA KUHN  30y  who presents with a chief complaint of subjective fever and chills for the past 5 days and recorded fever of 101.0 at home on 10/28. She states she has been having fever, chills, and sweating for the past several days. She endorses mild dysuria for the past few days but otherwise denies any recent illness, breastfeeds regularly and denies breast tenderness or pain. She endorses abdominal/incisional pain but reports it is well controlled with motrin and oxy.     OB/GYN HISTORY:     Primary Obgyn: Dr. Madison  POD#13 status post pLTCS

## 2020-10-29 NOTE — H&P ADULT - ASSESSMENT
Admit for IV abx BLANCA KUHN  30y  POD#13 status post pLTCS who presented to the ED with a chief complaint of subjective fever and chills for the past 5 days and upon workup CT notable for a 6.7 x 2.1 cm fluid collection in the right rectus sheath, concerning for abscess. WBC wnl (6) VSS remain stable (no tachycardia or fever) but pt subjectively continues to endorse feeling unwell with subjective fevers and chills. Pt will be admitted for treatment of postoperative course complicated by postoperative infection.

## 2020-10-29 NOTE — CONSULT NOTE ADULT - SUBJECTIVE AND OBJECTIVE BOX
----------------------------------------------------------  Interventional Radiology Brief Consult Note  -----------------------------------------------------------    Reason for Referral: Anterior abdominal wall collection    Clinical Summary: 30y  who presents with a chief complaint of subjective fever and chills for the past 5 days and recorded fever of 101.0 at home on 10/28. She states she has been having fever, chills, and sweating for the past several days. She endorses mild dysuria for the past few days but otherwise denies any recent illness, breastfeeds regularly and denies breast tenderness or pain. She endorses abdominal/incisional pain but reports it is well controlled with motrin and oxy.     Vitals:  T(C): 36.8 (10-29-20 @ 13:53), Max: 37.8 (10-28-20 @ 22:15)  HR: 74 (10-29-20 @ 13:53) (65 - 86)  BP: 130/79 (10-29-20 @ 13:53) (120/75 - 151/83)  RR: 18 (10-29-20 @ 13:53) (16 - 19)  SpO2: 98% (10-29-20 @ 13:53) (96% - 99%)    Labs:           11.6  5.96)-----(334     (10-29-20 @ 07:25)         35.7     137 | 104 | 9  --------------------< 95     (10-29-20 @ 07:25)  3.5 | 21 | 0.49         Assessment: 30y Female who has an abdominal wall collection consistent with hematoma after discussion with primary team.    Recommendations:  - After discussion with primary team and attending note (Dr. Wagner) the collection is more likely hematoma.  - Given that the patient is afebrile, has no WBC elevation and is hemodynamically stable, no intervention is recommended at this time.  - Should the patients condition change please feel free to reconsult IR.  - Case reviewed with Dr. Barajas

## 2020-10-29 NOTE — H&P ADULT - NSHPREVIEWOFSYSTEMS_GEN_ALL_CORE
CONSTITUTIONAL: No weakness, fevers or chills  EYES/ENT: No visual changes  NECK: No pain or stiffness  RESPIRATORY: No cough, wheezing; No shortness of breath  CARDIOVASCULAR: No chest pain or palpitations  GASTROINTESTINAL: No abdominal or epigastric pain. No nausea, vomiting; No diarrhea or constipation  GENITOURINARY: No dysuria, frequency or hematuria  NEUROLOGICAL: No headache, LOC  All other review of systems is negative unless indicated above CONSTITUTIONAL: (+) weakness, fevers or chills  EYES/ENT: No visual changes  RESPIRATORY: (+) dry cough, no wheezing; No shortness of breath  CARDIOVASCULAR: No chest pain or palpitations  GASTROINTESTINAL: No nausea, vomiting; No diarrhea or constipation  GENITOURINARY: (+) dysuria, no frequency or hematuria  NEUROLOGICAL: No headache, LOC  EXTREMITIES: bilateral leg swelling   DERM: no onset rash this morning on L knee and bilateral inner thighs  All other review of systems is negative unless indicated above

## 2020-10-29 NOTE — PROGRESS NOTE ADULT - SUBJECTIVE AND OBJECTIVE BOX
Attg.   in to see pt, received sign out from Dr. Dc, , pt is POD # 12, s/p  section, admitted for inpatient management for postoperative febrile morbidity, and findings of 6cm rectus sheath hematoma  on imaging through the ED.   discussed planned management with patient , in the form of CXR, continuation of Invanz I.V. q 24 hours, and consultation with IR for possible drainange of rectus hematoma.   pt advised that discharge will be predicated upon, being afebrile for 24 hours, and all preliminary test results have been addressed.   Jesus GOOD

## 2020-10-29 NOTE — H&P ADULT - NSICDXPASTSURGICALHX_GEN_ALL_CORE_FT
PAST SURGICAL HISTORY:  No Past Surgical History     S/P  section     S/P dilation and curettage x2

## 2020-10-30 ENCOUNTER — TRANSCRIPTION ENCOUNTER (OUTPATIENT)
Age: 30
End: 2020-10-30

## 2020-10-30 VITALS
SYSTOLIC BLOOD PRESSURE: 115 MMHG | RESPIRATION RATE: 18 BRPM | TEMPERATURE: 98 F | OXYGEN SATURATION: 99 % | DIASTOLIC BLOOD PRESSURE: 77 MMHG | HEART RATE: 78 BPM

## 2020-10-30 LAB
BASOPHILS # BLD AUTO: 0.01 K/UL — SIGNIFICANT CHANGE UP (ref 0–0.2)
BASOPHILS NFR BLD AUTO: 0.2 % — SIGNIFICANT CHANGE UP (ref 0–2)
CULTURE RESULTS: SIGNIFICANT CHANGE UP
EOSINOPHIL # BLD AUTO: 0.06 K/UL — SIGNIFICANT CHANGE UP (ref 0–0.5)
EOSINOPHIL NFR BLD AUTO: 1 % — SIGNIFICANT CHANGE UP (ref 0–6)
HCT VFR BLD CALC: 37.9 % — SIGNIFICANT CHANGE UP (ref 34.5–45)
HGB BLD-MCNC: 12.2 G/DL — SIGNIFICANT CHANGE UP (ref 11.5–15.5)
IMM GRANULOCYTES NFR BLD AUTO: 0.3 % — SIGNIFICANT CHANGE UP (ref 0–1.5)
LYMPHOCYTES # BLD AUTO: 2.34 K/UL — SIGNIFICANT CHANGE UP (ref 1–3.3)
LYMPHOCYTES # BLD AUTO: 40.3 % — SIGNIFICANT CHANGE UP (ref 13–44)
MCHC RBC-ENTMCNC: 29 PG — SIGNIFICANT CHANGE UP (ref 27–34)
MCHC RBC-ENTMCNC: 32.2 % — SIGNIFICANT CHANGE UP (ref 32–36)
MCV RBC AUTO: 90.2 FL — SIGNIFICANT CHANGE UP (ref 80–100)
MONOCYTES # BLD AUTO: 0.45 K/UL — SIGNIFICANT CHANGE UP (ref 0–0.9)
MONOCYTES NFR BLD AUTO: 7.7 % — SIGNIFICANT CHANGE UP (ref 2–14)
NEUTROPHILS # BLD AUTO: 2.93 K/UL — SIGNIFICANT CHANGE UP (ref 1.8–7.4)
NEUTROPHILS NFR BLD AUTO: 50.5 % — SIGNIFICANT CHANGE UP (ref 43–77)
NRBC # FLD: 0 K/UL — SIGNIFICANT CHANGE UP (ref 0–0)
PLATELET # BLD AUTO: 323 K/UL — SIGNIFICANT CHANGE UP (ref 150–400)
PMV BLD: 9.4 FL — SIGNIFICANT CHANGE UP (ref 7–13)
RBC # BLD: 4.2 M/UL — SIGNIFICANT CHANGE UP (ref 3.8–5.2)
RBC # FLD: 12.7 % — SIGNIFICANT CHANGE UP (ref 10.3–14.5)
SPECIMEN SOURCE: SIGNIFICANT CHANGE UP
WBC # BLD: 5.81 K/UL — SIGNIFICANT CHANGE UP (ref 3.8–10.5)
WBC # FLD AUTO: 5.81 K/UL — SIGNIFICANT CHANGE UP (ref 3.8–10.5)

## 2020-10-30 PROCEDURE — 99238 HOSP IP/OBS DSCHRG MGMT 30/<: CPT

## 2020-10-30 RX ADMIN — ERTAPENEM SODIUM 120 MILLIGRAM(S): 1 INJECTION, POWDER, LYOPHILIZED, FOR SOLUTION INTRAMUSCULAR; INTRAVENOUS at 03:45

## 2020-10-30 NOTE — PROGRESS NOTE ADULT - ATTENDING COMMENTS
Patient seen and examined by me.  Agree with above assessment and plan.  Symptoms and findings most likely secondary to COVID 19 infection.  Incision clean and intact. Afebrile.  Will d/c home today on PO Abx.

## 2020-10-30 NOTE — DISCHARGE NOTE PROVIDER - CARE PROVIDER_API CALL
Mia Malhotra J  OBSTETRICS AND GYNECOLOGY  [address] [place] [address]  Phone: [phone]  Fax: [phone]  Follow Up Time:

## 2020-10-30 NOTE — PROGRESS NOTE ADULT - SUBJECTIVE AND OBJECTIVE BOX
OB/GYN Progress Note    S: Patient seen and examined at bedside. No acute events overnight. No acute complaints. Pain well controlled. Patient is ambulating and tolerating a regular diet. Patient is passing flatus. Patient is voiding spontaneously. Denies CP, SOB, N/V, fevers, and chills.      Vital Signs Last 24 Hours  T(C): 36.9 (10-30-20 @ 06:09), Max: 37.2 (10-29-20 @ 10:00)  HR: 85 (10-30-20 @ 06:09) (74 - 91)  BP: 117/70 (10-30-20 @ 06:09) (112/66 - 130/79)  RR: 17 (10-30-20 @ 06:09) (17 - 19)  SpO2: 97% (10-30-20 @ 06:09) (96% - 98%)    I&O's Summary    29 Oct 2020 07:01  -  30 Oct 2020 07:00  --------------------------------------------------------  IN: 360 mL / OUT: 900 mL / NET: -540 mL        Physical Exam:  General: NAD  CV: RRR  Lungs: CTAB  Abdomen: soft, minimally-tender, non distended  Ext: no pain or swelling     Labs:                        12.2   5.81  )-----------( 323      ( 30 Oct 2020 06:00 )             37.9   baso 0.2    eos 1.0    imm gran 0.3    lymph 40.3   mono 7.7    poly 50.5                         11.6   5.96  )-----------( 334      ( 29 Oct 2020 07:25 )             35.7   baso 0.2    eos 0.2    imm gran 0.5    lymph 42.1   mono 8.6    poly 48.4                         11.7   6.49  )-----------( 357      ( 28 Oct 2020 22:40 )             35.6   baso x      eos x      imm gran x      lymph x      mono x      poly x          MEDICATIONS  (STANDING):  ertapenem  IVPB 1000 milliGRAM(s) IV Intermittent every 24 hours  heparin   Injectable 5000 Unit(s) SubCutaneous every 8 hours  influenza   Vaccine 0.5 milliLiter(s) IntraMuscular once    MEDICATIONS  (PRN):  acetaminophen   Tablet .. 975 milliGRAM(s) Oral every 6 hours PRN Moderate Pain (4 - 6)  ondansetron    Tablet 8 milliGRAM(s) Oral every 8 hours PRN Nausea and/or Vomiting  oxyCODONE    IR 5 milliGRAM(s) Oral every 6 hours PRN Severe Pain (7 - 10)  simethicone 80 milliGRAM(s) Chew every 6 hours PRN Gas

## 2020-10-30 NOTE — DISCHARGE NOTE PROVIDER - NSDCMRMEDTOKEN_GEN_ALL_CORE_FT
acetaminophen 325 mg oral tablet: 3 tab(s) orally   amoxicillin-clavulanate 875 mg-125 mg oral tablet: 1 tab(s) orally 2 times a day   ibuprofen 600 mg oral tablet: 1 tab(s) orally every 6 hours

## 2020-10-30 NOTE — DISCHARGE NOTE PROVIDER - NSDCCPCAREPLAN_GEN_ALL_CORE_FT
PRINCIPAL DISCHARGE DIAGNOSIS  Diagnosis: Abscess  Assessment and Plan of Treatment: Follow up with OB next week  Return with increased pain, bleeding, shortness of breathe       PRINCIPAL DISCHARGE DIAGNOSIS  Diagnosis: Abscess  Assessment and Plan of Treatment: Follow up with OB next week  Continue antibiotics as prescribed   Return with increased pain, bleeding, shortness of breathe

## 2020-10-30 NOTE — DISCHARGE NOTE PROVIDER - HOSPITAL COURSE
29y/o  POD#14 s/p pLTCS who presented to the ED with a chief complaint of subjective fever and chills for the past 5 days. VSS in the ED. CT A/P notable for a 6.7 x 2.1 cm fluid collection in the right rectus sheath, concerning for abscess. Patient started on Invanz. No leukocytosis. Mild transaminitis. Patient found to be COVID positive. IR called for consult: collection is more likely hematoma. Given that the patient is afebrile, has no WBC elevation and is hemodynamically stable, no intervention is recommended at this time. Pt stable without pain this morning, stable for discharge home with 5 more days of Augmentin and follow up next week with OB.

## 2020-10-30 NOTE — DISCHARGE NOTE PROVIDER - NSDCFUADDINST_GEN_ALL_CORE_FT
You have been diagnosed with the COVID-19. You must self quarantine to complete a 14 day time period from the date of your diagnosis.  Monitor for fevers, shortness of breath and cough primarily.  Monitor your temperature daily to not any changes and increases.   It has been determined that you no longer need hospitalization and can recover while remaining in self-quarantine at home. You should follow the prevention steps below until a healthcare provider or local or state health department says you can return to your normal activities.  1. You should restrict activities outside your home, except for getting medical care.  2. Do not go to work, school, or public areas.  3. Avoid using public transportation, ride-sharing, or taxis.  4. Separate yourself from other people and animals in your home.  5. Call ahead before visiting your doctor.  6. Wear a facemask.  7. Cover your coughs and sneezes.  8. Clean your hands often.  9. Avoid sharing personal household items.    10. Clean all “high-touch” surfaces everyday.11. Monitor your symptoms.  If you have a medical emergency and need to call 911, notify the dispatch personnel that you have COVID-19 If possible, put on a facemask before emergency medical services arrive.  12. Stopping home isolation.  Patients with confirmed COVID-19 should remain under home isolation precautions for 14 days since the positive COVID-19 test and until the risk of secondary transmission to others is thought to be low. The decision to discontinue home isolation precautions should be made on a case-by-case basis, in consultation with healthcare providers and state and local health departments. Your [facility] Department of Health can be reached at [phone] for further information about COVID-19.

## 2020-10-30 NOTE — PROGRESS NOTE ADULT - ASSESSMENT
30y  POD#14 s/p pLTCS who presented to the ED with a chief complaint of subjective fever and chills for the past 5 days. VSS in the ED. CT A/P notable for a 6.7 x 2.1 cm fluid collection in the right rectus sheath, concerning for abscess. Patient started on Invanz. No leukocytosis. Mild transaminitis. Patient found to be COVID positive. Unclear etiology of fevers.     Neuro: Tylenol. Oxy PRN  CV: Hemodynamically stable  Pulm: Saturating well on room air, encourage ambulation. CXR to eval for possible lung consolidations per MFM recommendation WNL  GI: Continue regular diet  :  Voiding spontaneously   Heme: c/w HSQ and SCDs for DVT ppx  FEN: LR@125. replete electrolytes prn   ID: Febrile at home. Afebrile here. No leukocytosis. CT A/P with 6.7 x 2.1 cm fluid collection in the right rectus sheath, concerning for abscess. c/w Invanz. IR consult per MFM recommendation. IR declining drainage of hematoma.   Endo: No active issues   Dispo: Continue inpatient care    EDUARD Baeza PGY3

## 2020-10-30 NOTE — DISCHARGE NOTE NURSING/CASE MANAGEMENT/SOCIAL WORK - NSDCPNINST_GEN_ALL_CORE
please call your provider or go to the ER if you experience any chest pain, shortness of breath, uncontrolled pain, fever > 100.4, nausea/vomiting, or pus/bleeding from surgical incision.

## 2020-10-30 NOTE — DISCHARGE NOTE NURSING/CASE MANAGEMENT/SOCIAL WORK - PATIENT PORTAL LINK FT
You can access the FollowMyHealth Patient Portal offered by [facility] by registering at the following website: [email or web address]. By joining [employer]’s FollowMyHealth portal, you will also be able to view your health information using other applications (apps) compatible with our system.

## 2020-11-03 ENCOUNTER — TRANSCRIPTION ENCOUNTER (OUTPATIENT)
Age: 30
End: 2020-11-03

## 2020-11-03 LAB
CULTURE RESULTS: SIGNIFICANT CHANGE UP
CULTURE RESULTS: SIGNIFICANT CHANGE UP
SPECIMEN SOURCE: SIGNIFICANT CHANGE UP
SPECIMEN SOURCE: SIGNIFICANT CHANGE UP

## 2020-11-20 DIAGNOSIS — O28.3 ABNORMAL ULTRASONIC FINDING ON ANTENATAL SCREENING OF MOTHER: ICD-10-CM

## 2020-11-20 DIAGNOSIS — O35.8XX0 MATERNAL CARE FOR OTHER (SUSPECTED) FETAL ABNORMALITY AND DAMAGE, NOT APPLICABLE OR UNSPECIFIED: ICD-10-CM

## 2020-11-20 DIAGNOSIS — O40.3XX0 POLYHYDRAMNIOS, THIRD TRIMESTER, NOT APPLICABLE OR UNSPECIFIED: ICD-10-CM

## 2020-12-01 ENCOUNTER — APPOINTMENT (OUTPATIENT)
Dept: OBGYN | Facility: CLINIC | Age: 30
End: 2020-12-01
Payer: COMMERCIAL

## 2020-12-01 VITALS
WEIGHT: 197.1 LBS | DIASTOLIC BLOOD PRESSURE: 83 MMHG | TEMPERATURE: 98.5 F | BODY MASS INDEX: 34.92 KG/M2 | HEART RATE: 94 BPM | HEIGHT: 63 IN | SYSTOLIC BLOOD PRESSURE: 122 MMHG

## 2020-12-01 PROBLEM — O35.9XX0 FETAL ABNORMALITY IN PREGNANCY: Status: RESOLVED | Noted: 2020-06-12 | Resolved: 2020-12-01

## 2020-12-01 PROBLEM — Z34.93 ENCOUNTER FOR PREGNANCY RELATED EXAMINATION, THIRD TRIMESTER: Status: RESOLVED | Noted: 2020-08-13 | Resolved: 2020-12-01

## 2020-12-01 PROBLEM — Z87.898 HISTORY OF URINARY FREQUENCY: Status: RESOLVED | Noted: 2020-10-06 | Resolved: 2020-12-01

## 2020-12-01 PROCEDURE — 99212 OFFICE O/P EST SF 10 MIN: CPT

## 2020-12-01 PROCEDURE — 99072 ADDL SUPL MATRL&STAF TM PHE: CPT

## 2020-12-01 RX ORDER — OXYCODONE 5 MG/1
5 TABLET ORAL EVERY 6 HOURS
Qty: 20 | Refills: 0 | Status: DISCONTINUED | COMMUNITY
Start: 2020-10-18 | End: 2020-12-01

## 2020-12-01 NOTE — HISTORY OF PRESENT ILLNESS
[Postpartum Follow Up] : postpartum follow up [Primary C/S] : delivered by  section [Male] : Delivery History: baby boy [Wt. ___] : weighing [unfilled] [ Section] :  section [Delivery Date Was ___] : delivery date was [unfilled] [Boy] : baby is a boy [Circumcised] : circumcised [Living at Home] : is currently living at home [Breastfeeding] : currently nursing [Complications:___] : complications include: [unfilled] [Clean/Dry/Intact] : clean, dry and intact [Awake] : awake [Alert] : alert [Soft] : soft [Oriented x3] : oriented to person, place, and time [No Lesions] : no genitalia lesions [Normal] : external genitalia [BF with Difficulty] : nursing without difficulty [Erythema] : not erythematous [Swelling] : not swollen [Dehiscence] : not dehisced [Acute Distress] : no acute distress [Mass] : no breast mass [Nipple Discharge] : no nipple discharge [Axillary LAD] : no axillary lymphadenopathy [Tender] : non tender [Distended] : not distended [Depressed Mood] : not depressed [Flat Affect] : affect not flat [Motion Tenderness] : there was no cervical motion tenderness [Tenderness] : nontender [Adnexa Tenderness] : were not tender [Doing Well] : is doing well [No Sign of Infection] : is showing no signs of infection [Excellent Pain Control] : has excellent pain control [None] : None [FreeTextEntry8] : This 29 yo P 4034 presents for PPV,  post primary C/S for CAT III FH,  with subsequent re-hospitalization on POD # 14 due to right rectus sheath ? hematoma, also found to also be + for COVID at that time; today she is feeling almost like  herself again, great appetite, denies any issues with voiding or stooling, desires OCP. [de-identified] : SHILO [de-identified] : Nl PPV [de-identified] : Will Rx Susie- consistent PO intake stressed; RTO 3-4 months annual

## 2020-12-01 NOTE — HISTORY OF PRESENT ILLNESS
[Postpartum Follow Up] : postpartum follow up [Complications:___] : no complications [Primary C/S] : delivered by  section [Male] : Delivery History: baby boy [Wt. ___] : weighing [unfilled] [ Section] :  section [Delivery Date Was ___] : delivery date was [unfilled] [Boy] : baby is a boy [None] : The patient did not have any  complications [Circumcised] : circumcised [Living at Home] : is currently living at home [Breastfeeding] : currently nursing [BF with Difficulty] : nursing without difficulty [de-identified] : SHILO

## 2020-12-23 ENCOUNTER — NON-APPOINTMENT (OUTPATIENT)
Age: 30
End: 2020-12-23

## 2021-03-31 NOTE — H&P ADULT - NSHPLABSRESULTS_GEN_ALL_CORE
por favor:    camina con roberts esposo cada alvina  10 minutos es sufficiente :)    empieze con la pastilla prozac (fluoxetine) a subir roberts animo     cambie pravastatin a atorvastatin que es mejor trabajadora    otra vez richie metformin dos veces por alvina    aspirina 81mg/ alvina       11.7   6.49  )-----------( 357      ( 28 Oct 2020 22:40 )             35.6     10-28    140  |  106  |  13  ----------------------------<  86  4.1   |  22  |  0.59    Ca    8.8      28 Oct 2020 22:40  Phos  2.8     10-28  Mg     1.7     10-28    TPro  7.0  /  Alb  3.9  /  TBili  < 0.2<L>  /  DBili  x   /  AST  37<H>  /  ALT  49<H>  /  AlkPhos  182<H>  10-28    I&O's Detail      Urinalysis Basic - ( 28 Oct 2020 22:40 )    Color: LIGHT YELLOW / Appearance: CLEAR / S.021 / pH: 6.5  Gluc: NEGATIVE / Ketone: NEGATIVE  / Bili: NEGATIVE / Urobili: NORMAL   Blood: LARGE / Protein: 10 / Nitrite: NEGATIVE   Leuk Esterase: LARGE / RBC: 11-25 / WBC >50   Sq Epi: OCC / Non Sq Epi: x / Bacteria: NEGATIVE    IMAGING & RADIOLOGY:  < from: CT Abdomen and Pelvis w/ IV Cont (10.29.20 @ 01:13) >      *** ADDENDUM 10/29/2020  ***    Indication: Pain.    *** END OF ADDENDUM 10/29/2020  ***    PROCEDURE DATE:  Oct 29 2020       INTERPRETATION:  CLINICAL INFORMATION: Concern for infection.    COMPARISON: CT abdomen dated 2013.    PROCEDURE:  CT of the Abdomen and Pelvis was performed with intravenous contrast.  Intravenous contrast: 90 ml Omnipaque 350. 10 ml discarded.  Oral contrast: None.  Sagittal and coronal reformats were performed.    FINDINGS:  LOWER CHEST: Bibasilar atelectasis.    LIVER: Hepatomegaly.  BILE DUCTS: Normal caliber.  GALLBLADDER: Within normal limits.  SPLEEN: The spleen is at the upper limits of normal.  PANCREAS: Within normal limits.  ADRENALS: Within normal limits.  KIDNEYS/URETERS: Within normal limits.    BLADDER: Within normal limits.  REPRODUCTIVE ORGANS: Enlarged uterus status post .    BOWEL: No bowel obstruction. Appendix is normal.  PERITONEUM: No ascites.  VESSELS: Within normal limits.  RETROPERITONEUM/LYMPH NODES: No lymphadenopathy.  ABDOMINAL WALL: There is a 6.7 x 2.1 cm fluid collection in the right rectus sheath.  BONES: Within normal limits.    IMPRESSION:  6.7 x 2.1 cm fluid collection in the right rectus sheath, concerning for abscess.      ***Please see the addendum at the top of this report. It may contain additional important information or changes.****    < end of copied text >

## 2021-04-23 RX ORDER — NORETHINDRONE 0.35 MG/1
0.35 TABLET ORAL DAILY
Qty: 28 | Refills: 4 | Status: ACTIVE | COMMUNITY
Start: 2020-12-01 | End: 1900-01-01

## 2021-04-26 ENCOUNTER — RX RENEWAL (OUTPATIENT)
Age: 31
End: 2021-04-26

## 2021-04-27 DIAGNOSIS — Z97.5 PRESENCE OF (INTRAUTERINE) CONTRACEPTIVE DEVICE: ICD-10-CM

## 2021-04-27 DIAGNOSIS — Z30.9 ENCOUNTER FOR CONTRACEPTIVE MANAGEMENT, UNSPECIFIED: ICD-10-CM

## 2021-06-01 ENCOUNTER — APPOINTMENT (OUTPATIENT)
Dept: OBGYN | Facility: CLINIC | Age: 31
End: 2021-06-01

## 2021-06-30 RX ORDER — NORETHINDRONE 0.35 MG/1
0.35 TABLET ORAL DAILY
Qty: 1 | Refills: 2 | Status: ACTIVE | COMMUNITY
Start: 2021-04-27 | End: 1900-01-01

## 2021-07-06 NOTE — H&P ADULT - NSHPSOCIALHISTORY_GEN_ALL_CORE
[facility]                                                                                          Hospitalist Progress Note  Mounika Lua MD  Answering service: [phone] OR 36 from in house phone        Date of Service:  2021  NAME:  Leonard Jerez  :  1932  MRN:  [id number]      Admission Summary:   As per HPI : Leonard Jerez is a 80 y.o. female with a past medical history of hypertension, dyslipidemia, and prior stroke who presents at the request of Dr. Sharifa Ortiz for new hemorrhagic brain lesions concerning for metastatic process.     In the ED, labs were pending at the time of my evaluation. MRI brain from 2021 showed multiple lesions present in the supratentorial, and infratentorial hemorrhagic metastasis with no midline shift or herniation. Interval history / Subjective:     No events overnight, patient reports occasional confusion, but no other new symptoms.       Assessment & Plan:     #Brain lesions with hemorrhage  -Patient sent by Dr. Sharifa Ortiz, as an outpatient and referred patient to [facility] for evaluation of hemorrhagic lesions seen on brain MRI, that are concerning for metastatic cancer.  - continue Decardron, continue Keppra  - Recent CT abd/pevlis shows not primary, CT chest 4 mm nodule in right middle lobe, no other significant pathology observed, case discussed with neurosurgery, possible brain biopsy tomorrow,  oncology on board possible radio +/- chemo.   -Patient reports occasional confusion, trouble maintaining balance, reports no other symptoms.     #Hypertension  BP stable, continue home meds     #Dyslipidemia  #hx CVA  -residual LUE paresthesia   - Plavix on hold   -continue statin     FEN: cardiac  dvt ppx: SCD  MPOA: Trygve Crooks (son)  Code: Full     Hospital Problems  Date Reviewed: 2021        Codes Class Noted POA    Brain mass ICD-10-CM: G93.89  ICD-9-CM: 348.89  2021 Unknown                Review of Systems:   Pertinent items are noted in HPI. Vital Signs:    Last 24hrs VS reviewed since prior progress note. Most recent are:  Visit Vitals  /84 (BP 1 Location: Right upper arm, BP Patient Position: At rest)   Pulse 68   Temp 97.4 °F (36.3 °C)   Resp 20   Ht 5' 4.29\" (1.633 m)   Wt 58 kg (127 lb 12.8 oz)   SpO2 95%   BMI 21.74 kg/m²         Intake/Output Summary (Last 24 hours) at 7/6/2021 1641  Last data filed at 7/6/2021 1159  Gross per 24 hour   Intake 249.17 ml   Output    Net 249.17 ml        Physical Examination:     I had a face to face encounter with this patient and independently examined them on 7/6/2021 as outlined below:          Constitutional:  No acute distress, cooperative, pleasant    ENT:  Oral mucosa moist, oropharynx benign. Resp:  CTA bilaterally. No wheezing/rhonchi/rales. No accessory muscle use   CV:  Regular rhythm, normal rate, no murmurs, gallops, rubs    GI:  Soft, non distended, non tender. normoactive bowel sounds, no hepatosplenomegaly     Musculoskeletal:  No edema, warm, 2+ pulses throughout    Neurologic:  Moves all extremities. AAOx3, CN II-XII reviewed            Data Review:    Review and/or order of clinical lab test      Labs:     Recent Labs     07/05/21  0044 07/04/21  0446   WBC 7.2 6.7   HGB 12.8 14.8   HCT 38.1 44.4    223     Recent Labs     07/05/21  0044 07/04/21  0446    138   K 4.1 3.9    107   CO2 24 25   BUN 23* 17   CREA 0.75 0.67   * 138*   CA 9.0 9.6     No results for input(s): ALT, AP, TBIL, TBILI, TP, ALB, GLOB, GGT, AML, LPSE in the last 72 hours. No lab exists for component: SGOT, GPT, AMYP, HLPSE  No results for input(s): INR, PTP, APTT, INREXT, INREXT in the last 72 hours. No results for input(s): FE, TIBC, PSAT, FERR in the last 72 hours. No results found for: FOL, RBCF   No results for input(s): PH, PCO2, PO2 in the last 72 hours. No results for input(s): CPK, CKNDX, TROIQ in the last 72 hours.     No lab exists for component: CPKMB  Lab Results   Component Value Date/Time    Cholesterol, total 163 02/14/2019 12:00 AM    HDL Cholesterol 57 02/14/2019 12:00 AM    LDL, calculated 88 02/14/2019 12:00 AM    Triglyceride 92 02/14/2019 12:00 AM     No results found for: [facility]  Lab Results   Component Value Date/Time    Color YELLOW/STRAW 05/24/2021 11:30 AM    Appearance CLEAR 05/24/2021 11:30 AM    Specific gravity 1.018 05/24/2021 11:30 AM    Specific gravity 1.020 06/03/2017 06:45 PM    pH (UA) 6.0 05/24/2021 11:30 AM    Protein 100 (A) 05/24/2021 11:30 AM    Glucose Negative 05/24/2021 11:30 AM    Ketone Negative 05/24/2021 11:30 AM    Bilirubin NEGATIVE  06/03/2017 06:45 PM    Urobilinogen 0.2 05/24/2021 11:30 AM    Nitrites Negative 05/24/2021 11:30 AM    Leukocyte Esterase MODERATE (A) 05/24/2021 11:30 AM    Epithelial cells MODERATE (A) 05/24/2021 11:30 AM    Bacteria 1+ (A) 05/24/2021 11:30 AM    WBC 5-10 05/24/2021 11:30 AM    RBC 0-5 05/24/2021 11:30 AM         Medications Reviewed:     Current Facility-Administered Medications   Medication Dose Route Frequency    lisinopriL (PRINIVIL, ZESTRIL) tablet 40 mg  40 mg Oral DAILY    ondansetron (ZOFRAN) injection 4 mg  4 mg IntraVENous Q4H PRN    docusate sodium (COLACE) capsule 100 mg  100 mg Oral DAILY    dexamethasone (DECADRON) 4 mg/mL injection 4 mg  4 mg IntraVENous Q6H    hydrALAZINE (APRESOLINE) 20 mg/mL injection 10 mg  10 mg IntraVENous Q6H PRN    0.9% sodium chloride infusion  50 mL/hr IntraVENous CONTINUOUS    levETIRAcetam (KEPPRA) tablet 500 mg  500 mg Oral BID    metoclopramide HCl (REGLAN) tablet 5 mg  5 mg Oral BID    sodium chloride (NS) flush 5-40 mL  5-40 mL IntraVENous Q8H    sodium chloride (NS) flush 5-40 mL  5-40 mL IntraVENous PRN    acetaminophen (TYLENOL) tablet 650 mg  650 mg Oral Q6H PRN    Or    acetaminophen (TYLENOL) suppository 650 mg  650 mg Rectal Q6H PRN ______________________________________________________________________  EXPECTED LENGTH OF STAY: - - -  ACTUAL LENGTH OF STAY:          [address] [place], MD Denies any alcohol/tobacco/illicit drug use

## 2021-07-12 NOTE — OB RN PATIENT PROFILE - NS_CONSENTSAPP_OBGYN_ALL_OB
[FreeTextEntry1] : 50 yo man with history of HTN, obesity, FATTY LIVER  enlarged prostate, knee OA, psoriasis presents for evaluation of joint pain.  states that he has joint pain for now about 5 years.  states that he has significant joint stiffness that last about 30-60 minutes.  he has stiffness of the hands, shoulders, feet, back.  states that he feels better when he is active.  if he rest pains worsen.  uses advil with pain relief.  \par \par xrays: right knee with marker Osteoarthritic degenerative changes\par L knee mild OA changes\par shoulder normal \par FOBT positive \par \par labs in the past : \par cbc aND CMP NORMAL \par esr 19 AND CRP 5 \par LYME NEGATIVE\par ZAHRA 1.80 NUCLEOLAR\par NEGATIVE CCP/RF/DSDNA/CAMPBELL/SJOGRENS/SCL 70/DEB/CENTROMERE\par URIC 5.7\par CPK NORMAL\par PSA NORMAL\par TSH NORMAL \par hepb/c/quantiferon  negative ( 6/2021)\par \par xrays: \par shoulders normal\par hips normal\par hand normal\par Left foot calcaneal spur\par r foot normal\par R knee marked OA \par L knee mild OA \par ankles with mod calcification of the distal Achilles b/l  and left moderate proximal plantar fascia calcifications \par \par patient denies any IBD s/s, history of STDs, sausage digits, nail changes, red painful eye \par lupus ROS negative \par \par today: patient tried nabumetone and has notice great pain relieve.  he is much more comfortable and doing his ALDs independently.  his psoriasis is active with multiple lesion of the feet and legs 
N/A

## 2021-10-20 RX ORDER — NORETHINDRONE 0.35 MG/1
0.35 TABLET ORAL DAILY
Qty: 84 | Refills: 0 | Status: ACTIVE | COMMUNITY
Start: 2021-10-20 | End: 1900-01-01

## 2021-10-24 ENCOUNTER — NON-APPOINTMENT (OUTPATIENT)
Age: 31
End: 2021-10-24
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
## 2021-12-26 NOTE — OB PROVIDER H&P - GRAVIDA, OB PROFILE
"BRIEF NUTRITION  NOTE:    Received routine Nutrition Consult \"Heart Failure - Dietitian to instruct patient on 2 gram sodium diet.\"  Diet:  Moderate Consistent CHO (60 gm CHO per meal).  Will check diet education needs prior to discharge.    Callie Mathis, RD, LD, CNSC  " 7

## 2022-01-05 ENCOUNTER — RX RENEWAL (OUTPATIENT)
Age: 32
End: 2022-01-05

## 2022-01-07 ENCOUNTER — APPOINTMENT (OUTPATIENT)
Dept: OBGYN | Facility: CLINIC | Age: 32
End: 2022-01-07

## 2022-02-08 NOTE — OB PROVIDER H&P - NSWIC_OBGYN_ALL_OB
PATIENTINFORMATION    Date of Office Visit: 2022  Encounter Provider: ZION NGOU - BP/EKG/HR  Place of Service: [facility] CARDIOLOGY  Patient Name: Gary Grullon  : 1946    Subjective:     Encounter Date:2022      Patient ID: Gary Grullon is a 75 y.o. male.      ECG 12 Lead    Date/Time: 2022 2:41 PM  Performed by: Yash Soliz MD  Authorized by: Yash Soliz MD   Comparison: compared with previous ECG from 2021  Similar to previous ECG  Comparison to previous ECG: Rate better on this tracing   Rhythm: atrial fibrillation  Rate: normal  Conduction: conduction normal  ST Segments: ST segments normal  T Waves: T waves normal  QRS axis: normal  Other: no other findings  Other findings: non-specific ST-T wave changes    Clinical impression: abnormal EKG            Yash Soliz MD  22  14:40 EST    
No

## 2022-04-21 NOTE — ASU PATIENT PROFILE, ADULT - SURGICAL SITE INCISION
Department of Anesthesiology  Postprocedure Note    Patient: Shiela He. MRN: [id number]  YOB: 1946  Date of evaluation: 4/21/2022  Time:  1:02 PM     Procedure Summary     Date: 04/21/22 Room / Location: [address] [place] [address]    Anesthesia Start: 1247 Anesthesia Stop: 9495    Procedure: EGD DIAGNOSTIC ONLY (N/A ) Diagnosis: (Iron deficiency anemia)    Surgeons: Cele Sanchez MD Responsible Provider: ROSE Almanzar CRNA    Anesthesia Type: MAC ASA Status: 3          Anesthesia Type: MAC    Mariam Phase I:      Mariam Phase II:      Last vitals: Reviewed and per EMR flowsheets.        Anesthesia Post Evaluation    Patient location during evaluation: bedside  Patient participation: complete - patient participated  Level of consciousness: awake and awake and alert  Airway patency: patent  Nausea & Vomiting: no nausea and no vomiting  Complications: no  Cardiovascular status: blood pressure returned to baseline and hemodynamically stable  Respiratory status: acceptable  Hydration status: euvolemic
no

## 2022-06-22 NOTE — OB PROVIDER DELIVERY SUMMARY - NSEBL_OBGYN_ALL_OB_NU
800
Smokes 5 cigarettes a day, which she has since she was 13 y.o. She denies alcohol or illicit drug use.

## 2023-08-23 NOTE — ED PROVIDER NOTE - NO PERTINENT FAMILY HISTORY
<<----- Click to add NO pertinent Family History Birth Control Pills Pregnancy And Lactation Text: This medication should be avoided if pregnant and for the first 30 days post-partum.

## 2023-09-03 NOTE — PROGRESS NOTE ADULT - ATTENDING COMMENTS
Lab Results   Component Value Date    HGBA1C 8.5 (H) 08/16/2023       Recent Labs     09/03/23  1249 09/03/23  1308   POCGLU 137 134       Blood Sugar Average: Last 72 hrs:  (P) 135.5     Diabetes mellitus type 2 uncontrolled  Hold glipizide while inpatient  We will have her on Lantus at bedtime  Monitor Accu-Cheks  Avoid hypoglycemia  Hypoglycemia protocol in place saw patient this afternoon. Feeling tired and weak bit denies dizziness when she gets up to go to washroom. Tolerating PO. not tachycardic, CBC stable  Urine output borderline- will give 500 cc bolus

## 2023-10-11 NOTE — ASU DISCHARGE PLAN (ADULT/PEDIATRIC) - DISCHARGE PLAN IS COMPLETE AND GIVEN TO PATIENT
Patient arrived via EMS Dr. Esther Vaca with weakness, per office staff left side facial droop, on arrival the patient  able to smile symmetrically. Hand   equal bilateral. able to ambulate with no assistance.   No s/sx of distress
: Yes

## 2024-10-18 NOTE — OB PROVIDER H&P - [id number]
Surgery Instructions            Saranya Roberson  [id number]  1949      SURGEON:  Xiao Unger MD    Surgery Coordinator: Charlene RUIZ    Gynecological Oncology  [address] [facility] [place] [address] [place] [address], [facility]  Phone: [phone]                   Fax: [phone]      Pre-Admission Testing Appointment    You have a Pre-Admission Testing (PAT) appointment on 10/25/2024  at 230  You will need to be at hospital registration 10 minutes before that time. Directions to PAT and Registration will be listed below.    We understand your time is valuable. To prevent delays, please bring the following to your PAT apt. if it applies to you:  Written physician orders (if given to you by your physician)  All medications in the original bottles including over-the-counter medications (not a list)  Copy of living will or power of  documents   Copy of recent test results (EKG, stress test, echo, heart cath, etc.)  Copy of pacemaker or ICD cards and date of last interrogation   Copy of cardiac clearance letter from your cardiologist or primary care physician if history of heart problems  Name and phone number of your pharmacy, primary care physician and/or cardiologist  CPAP or BiPAP settings    Surgery Appointment      Your surgery has been scheduled on 10/31/2024.  You will need to go to Main Registration to check in at 1000. Then you will be sent to the [address] [place] [address] second floor surgery registration desk to check in.    Nothing by mouth after midnight on 10/30/2024.    If you are feeling sick, have a fever or cough and have seen your PCP let our office know 48 hours prior to surgery. It may be subject to rescheduling.       The Day of Surgery:    Do not chew gum or tobacco, smoke, or eat mints or hard candy. Shower and wash your hair. You may brush your teeth but do not swallow water. Use any wipes that Pre-admission testing has given  you.     Please arrive for surgery as instructed by the pre-op nurse, often one to two hours before your surgery.  Once you are called to go to your pre-op room, no one will be allowed in the pre op room.   Please note no one under age 12 is permitted to stay in the waiting area without supervision.  Remove all jewelry, including rings and piercings. Do not bring valuables to the hospital.  Wear loose-fitting clothing.  Avoid wearing eye makeup or contact lenses  We make every effort to begin surgery at your scheduled start time but delays do occur. We will keep you and your family updated about any delays  Please note: you MUST have a  over the age of 18 to drive you home from the hospital. You may not use Uber, Lyft or a taxi.    Please remember to bring:    Photo ID and current medical insurance card  Advanced directives, living will or power of  (if applicable)  Current list of all medications, including over-the- counter and herbal supplements  List of allergies  CPAP device if you have sleep apnea  Any assistive devices or equipment needed after surgery    While You are In the Pre-Op Room:  The nurse will review your health history and will place an IV (into the vein) in your hand or arm for fluids and medicines.  An anesthesia provider will talk with you about anesthesia and pain control during and after surgery.  A member of the surgical team can answer your questions.    Directions to [facility]  [address] [facility] ? [place] [address] ? [phone]    From [address] [place]:  Take [address] to the [address] [place] exit. Go right on [address] to [address]. Right on [address] to [address].   Left on [address] to [facility] which is on the left.    From [address] [place]:  Get off [address] at the [place] exit. Go left on [address] to [address]. Right on [address] to [address]. Left on   [address] to [facility] which is on the left.     From the South ([address]):  Follow [address] to approximately one mile inside [address]. [facility] is on the right at [address] and   [facility].     Parking:  Free  Parking - Take Entrance 2 off of [address] and go straight ahead to [address] [place] [address].  Self Parking - Take Entrance 1 off of [address], bear left and follow the road to [facility].    Directions to Registration:  If entering through front of [address] [place] [address] ( parking), take a right and proceed up the hallway connecting [address] [place] [address] to   [address] [place] [address]. Registration is on the left about alf up the wood.    If entering from [facility], take garage elevator to first floor (1), exit to the right and proceed through the doors to outside, follow the covered sidewalk to entrance of [facility], follow signs to [address] [place] [address], this leads to the [facility] lobby and information desk. Proceed past the information desk to the hallway that connects [address] [place] [address] to the [facility]. Registration is on the left about alf up the wood.    Directions to Pre-admission Testing:  Follow directions to Registration and Pre-admission Testing is next door to Registration             PREPARING FOR SURGERY  **Disability or Work Release Forms     Work: The amount of time you will be off work after surgery depends on both your surgery and your job. Discuss this with your doctor before surgery. If you have any questions about this, call your doctor.  You must provide all forms completed and signed to the GYN ONCOLOGY office.    FORM FOR AUHTHORIZATION FOR USE AND/OR DISCLOSURE OF PROCTED HEALTH INFORMATION CAN BE PROVIDED UPON REQUEST.    Preoperative Evaluation and Optimization  If your doctor tells you to get a preoperative evaluation from your primary care provider, cardiologist, or other specialist, it is your responsibility to make sure to complete these well  "before your surgery. We want you to get evaluated to make sure you are as healthy as possible when you have your surgery. If the evaluation, including all recommended testing, is not done in time, your surgery will be postponed.    If you take diabetic medications please consult with the prescriber.  Continue antidepressants, Beta Blockers \"olol\", anti-seizure medication, GERD medication (heartburn), Opioids and Parkinson's medication.  Let us know if you have a history of blood clots or are taking a blood thinner before your surgery, this will need to be held and you will need to discuss this with staff.   If you are taking any weight loss medications please let our staff now. Ideally they will need to be held 2 weeks prior to your procedure.  You are allowed 1 visitor that may remain in the waiting room at both locations.  Visitors cannot come back to pre-op or post-op areas.    Please note: you MUST have a  over the age of 18 to drive you home from the hospital. You may not use Uber, Lyft or a taxi.    Physical Fitness  Research shows that getting more physical activity before surgery can lower your risk for problems after surgery. Walking is a great way to improve your fitness level before surgery. Even if you start walking just a few weeks before surgery, it can make a big difference.     Quit Smoking  If you smoke, your risk of having a lung problem is at least twice that of a non-smoker.    Surgical incisions will not heal as well and you have a higher risk of infection  The heart has to work harder.  It is best to quit smoking 6 to 8 weeks before surgery. This gives your lungs more time to recover.    Outpatient Surgery  You will need to have someone bring you to the hospital, stay in the waiting room during your procedure and take you home at discharge. It is recommended that someone stay with you 24 hours after your procedure.     If you live more than a 4-hour drive away from the hospital, or live " in an area without easy access to an emergency department, we recommend you plan to spend another night or two close to the hospital before you go home. For assistance with hotel, prices and vouchers let our office know and we can let you talk with our Oncology Social worker, Cyn Porter.     Post-Operative Visit  You will be scheduled a post-operative appointment for 3 weeks after your surgical procedure. If you do not have an appointment please call the office and have that scheduled.     How to prevent nausea  The best way to prevent nausea is to eat frequent small meals. It is especially important to eat something before taking pain medication. Take your ondansetron if you are feeling nauseous do not wait.    Pain Management after Surgery    If you have kidney disease or liver disease and are not to take ibuprofen or Tylenol please let your doctor or nurse know.     Driving: Do not drive while you are taking prescription pain medications.     It is normal to have some pain after surgery. The goal of managing your acute pain after surgery is to minimize your pain so you feel comfortable enough to get up, take deep breaths, wash, get dressed, and do simple tasks in your home. Some discomfort is likely. We do not expect you to be completely free of pain.   Pain is usually worst the first 24-48 hours after surgery.    What can I do to relieve pain without medications?   Apply heat with a warm compress, hot water bottle, or heating pad. Do not put anything hot directly on your skin or lie on top of it.   Apply cooling with a cold gel pack, bag of peas, or crushed ice. Wrap in a soft cloth or towel.   Do not push or press on your incision. It is normal for your incision to be sore for up to 6 weeks if you push on it.   Unless your doctor gives you a different plan, ibuprofen and acetaminophen are the main medicines you will use to manage your pain.   You may also get a prescription for an opioid such as oxycodone or  hydrocodone. Opioids should only be added as needed to reduce pain that is not adequately relieved by ibuprofen and acetaminophen.                                                                                         Typical Pain Medication Schedule  6 am Ibuprofen 600 mg   9 am acetaminophen 650 mg   12:00 pm Noon Ibuprofen 600 mg   3:00 pm Acetaminophen 650 mg   6:00 pm Ibuprofen 600 mg   9:00 pm Acetaminophen 650 mg   12:00 am Midnight  Ibuprofen 600 mg.      What if this schedule does not control my pain?   Please call the office and let us know at [phone]  Reduce the number and frequency of opioids as soon as you can. Do not take more opioid medication than your doctor has prescribed.   Common side effects and risks of opioids include drowsiness, mental confusion, dizziness, nausea, constipation, itching, dry mouth, and slowed breathing.   Never mix opioids with alcohol, sleep aids or anti-anxiety medications. These are dangerous combinations that increase the harmful effects of opioid pain medication. Many overdose deaths from opioids also involve at least one other drug or alcohol.   It is illegal to sell or share an opioid without a prescription properly issued by a licensed health care prescriber.    What is the best way to stop taking pain medications?  1. Stop opioid use.  2. Stop acetaminophen.  3. Gradually decrease how often you take ibuprofen. It is a good idea to take a 600 mg pill before you start a more tiring activity such as going shopping or for a long walk.  Once you get more active, you may have a day when your pain gets a little worse. If this happens, take ibuprofen. If ibuprofen does not relieve the pain, add acetaminophen.    What do I need to know about bowel movements?   Starting as soon as you get home, take 17 grams of Miralax (one capful) twice a day to keep your stool soft and prevent constipation. It is important to prevent constipation because straining can damage your  stitches. Your stool should be as soft as toothpaste. If your stool gets too loose, cut back to using Miralax only once a day.   If you used a bowel prep before surgery, it is common not to have a bowel movement on the first and second day after surgery.   If you have not had a bowel movement by 7 p.m. on the third day after surgery, do one of the following at bedtime:  Drink 1 ounce (2 tablespoons) of Milk of Magnesia (MOM). If you have used MOM before and know you need to take 2 ounces for it to work for you, it is OK to do this, or Take 2 Senekot tablets.   Go for short walks. Walking and being active will help you have a bowel movement.   If you have not had a bowel movement by noon on the fourth day after surgery, call the clinic where you were seen and ask to speak with a nurse.    What kind of vaginal bleeding is normal?  Spotting of pink or red blood from the vagina is normal. Brown-colored discharge that gradually changes to a light yellow or cream color is also normal and can last up to 6 weeks. The brownish discharge is old blood and often has a strong odor, this is okay. Call us if it becomes heavier or foul smelling or you are saturating a maxi pad within an hour.     At Home after Surgery: If you experience a medical emergency call 911 or have someone drive you to your nearest emergency department.     When should I call my doctor?  Call your doctor right away, any time of the day or night, including on weekends and holidays, if you have any of the following signs or symptoms:   A temperature over 100.4°F (38°C) If you don't have one, please buy a thermometer before your surgery.   Heavy bleeding (soaking a regular pad in an hour or less)   Severe pain in your abdomen or pelvis that the pain medication is not helping   Chest pain or difficulty breathing   Swelling, redness, or pain in your legs   An incision that opens   An incision that is red or hot   Fluid or blood leaking from an incision   New  bruising after leaving the hospital that is large or spreading. A little bit of bruising around an incision is normal.   Nausea and vomiting    Skin rash   Unable to urinate at all   Pain or stinging when you pass urine   Blood or cloudiness in your urine   Non-stop urge to pass urine, but only dribbling when you try to go   A sense that something is wrong.    Caring for post-surgical incisions     Do not have vaginal intercourse until your doctor evaluates you at a postop visit and tells you OK.     Showers: You may shower starting 24 hours after your surgery.    NO BATHS: do not take a tub bath up to 6 weeks after surgery.   Do not put any lotion, oil, gel, or powder on or near your incisions.     For incisions inside your vagina: Incisions inside the vagina are closed with dissolvable stitches. When they dissolve you may see little bits of suture material that look like thin pieces of string on your underwear or on toilet tissue after wiping. This is normal. Do not put anything inside the vagina until your doctor evaluates you at a postop visit and tells you when it will be OK.      For incisions on your skin: If there is a dressing over the incision, remove it before your first shower. Leave the slim adhesive strips that are under the dressing in place. During the week after surgery, they will usually curl up at the edges and then come off on their own. If they are still there a week after surgery, gently remove them.  To clean the incisions, first wash your hands, and then get your hands sudsy with soap and gently wash or let the sudsy water run down over the incisions. Dry the incisions well after washing by gently patting with a towel. You may use a blow dryer, but it must be on a low-heat setting.    When will my bladder function get back to normal?   You received extra fluid through your I.V. while you were in the hospital, so it is normal to urinate (pee) more than usual when you first get home.   It is  normal for your bladder function to be different after surgery. You may notice a pause before your urine stream starts or that your urine stream is slower. This will gradually get better, but it may take up to 6 months before you are back to normal. Be patient, relax, and sit on the toilet a little longer.   Drinking more water than usual will not help the bladder recover faster.    What is a normal energy level?  It is normal to have a decreased energy level after surgery. Listen to your body. If you need to rest, do it. Give yourself permission to take it easy. Once you settle into a normal routine at home, you will find that you slowly begin to feel better. Walking around the house and taking short walks outside will help you get back to normal.    What kind of exercise/activities can I do?   Exercise is important for a healthy recovery. We encourage you to begin normal physical activity, like walking, within hours of surgery. Start with short walks and gradually increase the distance and length of time that you walk.   Allow your body time to heal. Do not restart a difficult exercise routine until you have had your post-op exam and your doctor says it is OK.   Lifting: Unless you are given other instructions, for 6 weeks after your surgery do not lift anything over 15 pounds.   Travel: It is best if you do not go far away from home before your postop visit with your doctor. If you have travel plans, talk to your doctor about this before your surgery.      Financial Assistance:    If you have any questions or need assistance, contact your [facility] financial counseling office from 8:30 a.m.-4:30  p.m. Monday through Friday. Closed weekends.   [place]: [phone] or, or visit at [address] [facility], [address], near the entrance.  Financial Assistance Application available upon request      Patient Payments and Correspondence  Customer service representatives are available to assist you from 8:00 a.m.  to 6:00 p.m. Eastern Standard Time by calling [phone] Monday through Friday. You can also contact us through [employer].    [facility]  [address]  [place] [address] [phone]               AFV Abnormalities/Labor